# Patient Record
Sex: FEMALE | Race: WHITE | HISPANIC OR LATINO | Employment: FULL TIME | URBAN - METROPOLITAN AREA
[De-identification: names, ages, dates, MRNs, and addresses within clinical notes are randomized per-mention and may not be internally consistent; named-entity substitution may affect disease eponyms.]

---

## 2020-04-07 ENCOUNTER — HOSPITAL ENCOUNTER (OUTPATIENT)
Facility: MEDICAL CENTER | Age: 30
End: 2020-04-07
Attending: ADVANCED PRACTICE MIDWIFE
Payer: MEDICAID

## 2020-04-07 ENCOUNTER — INITIAL PRENATAL (OUTPATIENT)
Dept: OBGYN | Facility: CLINIC | Age: 30
End: 2020-04-07
Payer: MEDICAID

## 2020-04-07 VITALS
BODY MASS INDEX: 30.79 KG/M2 | WEIGHT: 191.6 LBS | HEIGHT: 66 IN | SYSTOLIC BLOOD PRESSURE: 108 MMHG | DIASTOLIC BLOOD PRESSURE: 64 MMHG

## 2020-04-07 DIAGNOSIS — Z87.59 HISTORY OF PREGNANCY INDUCED HYPERTENSION: ICD-10-CM

## 2020-04-07 DIAGNOSIS — Z34.82 ENCOUNTER FOR SUPERVISION OF OTHER NORMAL PREGNANCY IN SECOND TRIMESTER: ICD-10-CM

## 2020-04-07 PROCEDURE — 87491 CHLMYD TRACH DNA AMP PROBE: CPT

## 2020-04-07 PROCEDURE — 87591 N.GONORRHOEAE DNA AMP PROB: CPT

## 2020-04-07 PROCEDURE — 59402 PR NEW OB HIGH RISK: CPT | Performed by: ADVANCED PRACTICE MIDWIFE

## 2020-04-07 PROCEDURE — 88175 CYTOPATH C/V AUTO FLUID REDO: CPT

## 2020-04-07 SDOH — HEALTH STABILITY: MENTAL HEALTH: HOW OFTEN DO YOU HAVE A DRINK CONTAINING ALCOHOL?: NEVER

## 2020-04-07 NOTE — PROGRESS NOTES
NOB today   LMP: Approx: mid of November   Last pap: Pt unsure   Phone # [phone]  Pharmacy confirmed  C/o Pt feels some fetal movement. Headaches   AFP pended

## 2020-04-07 NOTE — LETTER
Estudios Para Detectar los Portadores de la Fibrosis Quística   (La Enfermedad Fibroquística del Páncreas)    Tiffanie Rush    Esta información esta relacionada con un examen de cora que puede determinar si usted o carrillo zak es portador del gen que causa la enfermedad hereditaria que se llama la fibrosis quística.     ¿QUE ES LA ENFERMEDAD FIBROSIS QUÍSTICA?  · La  fibrosis quística es devin enfermedad hereditaria que afecta a más de 25,000 niños y jóvenes adultos americanos.  · Los síntomas de la fibrosis quística varían de devin persona a otra.  Los síntomas más comunes son congestión pulmonar, diarrea y retraso en el crecimiento del austin.  La mayoría de las personas con la fibrosis quística padecen de problemas médicos muy severos, y algunas mueren jóvenes.  Otras tienen tan pocos síntomas que no se percatan de que tienen fibrosis quística.  · La fibrosis quística no afecta en absoluto la inteligencia de la persona.  · Aunque actualmente no hay devin tin para la fibrosis quística, los científicos están avanzando con respecto a nuevos tratamientos y en la búsqueda de la tin.  Anteriormente la mayoría de las personas que padecían de fibrosis quística morían muy jóvenes.  Hoy en día, muchas personas que padecen de la fibrosis quística sobreviven hasta los 20 o 30 anos de edad.    ¿EXISTE LA POSIBILIDAD DE QUE MI JORDANA PUEDA TENER FIBROSIS QUÍSTICA?  · Usted puede tener un hijo con la fibrosis quística aun cuando no hay nadie en carrillo louis que la padezca.  (Lea la grafica que sigue.)  · Existe devin prueba que puede ayudarle a determinar si rosi un gen para la fibrosis quística y si por eso corre un riesgo de tener un hijo con la enfermedad.  · Si ambos padres son portadores del gen para la fibrosis quística, hay devin (1) probabilidad entre 4 (25%) en cada embarazo, de que puedan tener un hijo afectada con fibrosis quística.  · Los portadores del gen para la fibrosis quística tienen devin copia del gen normal y  el otro gen alterado.  · Las personas con fibrosis quística tienen dos genes alterados para la fibrosis quística,  · Normalmente la mayoría de individuos tienen dos copias normales del gen para fibrosis quística.    Riesgo aproximado de que devin zak sin familiares con fibrosis quística pueda tener un hijo con fibrosis quística:  Origen / Riesgo  Devin zak Caucásica (de aj renato):  1 en 2,500  Devin zak :  1 en 8,000  Devin zak Afroamericana (de aj concha):  1 en 15,000  Devin zak Asiática:  1 en 32,000    ¿EXISTEN PRUEBAS PARA DETECTAR LOS PORTADORES DE LA FIBROSIS QUÍSTICA?  · Hay devin prueba de cora para determinar si usted o carrillo zak portan el gen para la fibrosis quística.  · Es importante entender que la prueba no detecta todos los portadores del gen para la fibrosis quística.  · Si la prueba determina que ambos padres con portadores, se puede realizar otras pruebas para determinar si carrillo futuro jay esta afectado.    ¿CUANTO HAZEL LA PRUEBA PARA DETERMINAR SI SOY PORTADOR(A) DEL GEN PARA LA FIBROSIS QUÍSTICA?  · El costo de la prueba varia según carrillo póliza de seguro medico y el laboratorio donde se efectúa el análisis.  · El costo promedio de la prueba es de $300.  · Carrillo consejera genética puede darle mas información acera de esta prueba para determinar si usted es portador de la fibrosis quística.    _____  Si, yo estoy interesada y me gustaria obtener mas información al respecto.  _____  No tengo interés ni en hacerme la prueba para determinar si soy portador(a) de gen para la fibrosis quística ni en recibir mas información al respecto.    Firma del paciente: _____________________________   4/7/2020

## 2020-04-07 NOTE — LETTER
April 7, 2020            Tiffanie Rush is currently being cared for at [facility].  This patient is pregnant and may continue to work.  She should not lift greater than 20 pounds and requires frequent rest periods (10 minutes every two hours).    She currently has a due date of 8/21/20        Thank you,          Adrianne Contreras C.N.M.

## 2020-04-07 NOTE — PROGRESS NOTES
Subjective:   Tiffanie Rush is a 29 y.o.  who presents for her new OB exam.  She is 20w4d with an MITCH of Estimated Date of Delivery: 20 by LMP. She is feeling well and has no concerns at this time. Denies VB, LOF, contractions or pain. She reports no ER visits or previous care in this pregnancy. Denies dysuria, vaginal DC, fever. Reports good fetal movement. Desires AFP.  Declines CF.      History reviewed. No pertinent past medical history.    Psych Hx: Patient denies any history of depression, anxiety, PTSD, bipolar or any other psychological issues.     History reviewed. No pertinent surgical history.     OB History    Para Term  AB Living   3 2 1     2   SAB TAB Ectopic Molar Multiple Live Births             2      # Outcome Date GA Lbr Onel/2nd Weight Sex Delivery Anes PTL Lv   3 Current            2 Term 14 40w0d  4 kg (8 lb 13.1 oz) M Vag-Spont  N RADHA   1 Para 10/25/10 40w0d  4.7 kg (10 lb 5.8 oz) M Vag-Spont  N RADHA        Gynecological Hx: Denies any hx of STIs, including HSV. Denies any vulvovaginal disorders and no hx of abnormal cervical cytology. Last pap unknown    Sexual Hx: One current male partner, who is  FOB     Family History   Problem Relation Age of Onset   • Diabetes Father      Denies any genetic disorders in family history.     Social History     Socioeconomic History   • Marital status:      Spouse name: Not on file   • Number of children: Not on file   • Years of education: Not on file   • Highest education level: Not on file   Occupational History   • Not on file   Social Needs   • Financial resource strain: Not on file   • Food insecurity     Worry: Not on file     Inability: Not on file   • Transportation needs     Medical: Not on file     Non-medical: Not on file   Tobacco Use   • Smoking status: Never Smoker   • Smokeless tobacco: Never Used   Substance and Sexual Activity   • Alcohol use: Not Currently     Frequency: Never   • Drug use: Never  "  • Sexual activity: Not Currently     Partners: Male   Lifestyle   • Physical activity     Days per week: Not on file     Minutes per session: Not on file   • Stress: Not on file   Relationships   • Social connections     Talks on phone: Not on file     Gets together: Not on file     Attends Religion service: Not on file     Active member of club or organization: Not on file     Attends meetings of clubs or organizations: Not on file     Relationship status: Not on file   • Intimate partner violence     Fear of current or ex partner: Not on file     Emotionally abused: Not on file     Physically abused: Not on file     Forced sexual activity: Not on file   Other Topics Concern   • Not on file   Social History Narrative   • Not on file       FOB is not and lives with Tiffanie Rush.  Pregnancy is un planned but desired.    She is currently working at [employer] and planning to work until end of pregnancy , denies any heavy lifting or exposure to potential teratogens like environmental or occupational toxins.   Denies alcohol use, drug use, or tobacco use in pregnancy.   Denies any current or hx of sexual, emotional or physical abuse or trauma.     Current Medications: PNV  Allergies: Denies allergies to medications, food, or environmental allergies    Objective:      Vitals:    04/07/20 0850   BP: 108/64   Weight: 86.9 kg (191 lb 9.6 oz)   Height: 1.676 m (5' 6\")        See Prenatal Physical and Prenatal Vitals  UA WNL today      Assessment:      1.  IUP @ 20w4d per LMP      2.  S=D      3.  See problem list as follows     There are no active problems to display for this patient.        Plan:   -  GC/CT & pap done today   - Prenatal labs ordered - lab slip provided. PIH labs added for patient related to history of hypertension. At this time, as history is unclear, will defer use of ASA therapy.   - Discussed PNV, nutrition, adequate water intake, and exercise/weight gain in pregnancy  - NOB informational packet " with anticipatory guidance given  - Reviewed Centering Pregnancy and patient is not candidate. Work schedule limits.   - S/sx of pregnancy warning signs and PTL precautions given  - Complete OB US in ASAP wks  - Return to clinic in 4 weeks.

## 2020-04-08 LAB
C TRACH DNA GENITAL QL NAA+PROBE: NEGATIVE
CYTOLOGY REG CYTOL: NORMAL
N GONORRHOEA DNA GENITAL QL NAA+PROBE: NEGATIVE
SPECIMEN SOURCE: NORMAL

## 2020-04-15 ENCOUNTER — TELEPHONE (OUTPATIENT)
Dept: OBGYN | Facility: CLINIC | Age: 30
End: 2020-04-15

## 2020-04-15 NOTE — TELEPHONE ENCOUNTER
----- Message from Adrianne Contreras C.N.M. sent at 4/9/2020 11:36 AM PDT -----  Noted; per guidelines recommend colpo now or 6 weeks postpartum. Will ask that patient treat yeast with OTC cream.  4/15/2020@8:48am. N/a, msg left for patient to call back.    Notes recorded by ALMA Galdamez on 4/20/2020 at 12:29 PM PDT  Pt to see Nikhil for another US to further assess heart.    4/21/20@1:54pm. N/a, msg left for patient to call back. Letter sent

## 2020-04-15 NOTE — LETTER
April 21, 2020          Dear Tiffanie Rush,      Please call us regarding your care.  One of the nurses is available to speak with you Monday through Friday, 8 a.m. to 5 p.m.    Please call us at [phone]; thank you for your prompt attention.        Sincerely,          Adrianne Contreras C.N.M.    Electronically Signed

## 2020-04-20 ENCOUNTER — HOSPITAL ENCOUNTER (OUTPATIENT)
Dept: LAB | Facility: MEDICAL CENTER | Age: 30
End: 2020-04-20
Attending: ADVANCED PRACTICE MIDWIFE
Payer: MEDICAID

## 2020-04-20 ENCOUNTER — APPOINTMENT (OUTPATIENT)
Dept: RADIOLOGY | Facility: IMAGING CENTER | Age: 30
End: 2020-04-20
Attending: ADVANCED PRACTICE MIDWIFE
Payer: MEDICAID

## 2020-04-20 DIAGNOSIS — Z87.59 HISTORY OF PREGNANCY INDUCED HYPERTENSION: ICD-10-CM

## 2020-04-20 DIAGNOSIS — O28.3 ABNORMAL PREGNANCY US: ICD-10-CM

## 2020-04-20 DIAGNOSIS — Z34.82 ENCOUNTER FOR SUPERVISION OF OTHER NORMAL PREGNANCY IN SECOND TRIMESTER: ICD-10-CM

## 2020-04-20 LAB
ABO GROUP BLD: NORMAL
ALBUMIN SERPL BCP-MCNC: 3.8 G/DL (ref 3.2–4.9)
ALBUMIN/GLOB SERPL: 1.2 G/DL
ALP SERPL-CCNC: 80 U/L (ref 30–99)
ALT SERPL-CCNC: 15 U/L (ref 2–50)
ANION GAP SERPL CALC-SCNC: 16 MMOL/L (ref 7–16)
AST SERPL-CCNC: 18 U/L (ref 12–45)
BASOPHILS # BLD AUTO: 0.2 % (ref 0–1.8)
BASOPHILS # BLD: 0.02 K/UL (ref 0–0.12)
BILIRUB SERPL-MCNC: 0.3 MG/DL (ref 0.1–1.5)
BLD GP AB SCN SERPL QL: NORMAL
BUN SERPL-MCNC: 4 MG/DL (ref 8–22)
CALCIUM SERPL-MCNC: 8.8 MG/DL (ref 8.5–10.5)
CHLORIDE SERPL-SCNC: 102 MMOL/L (ref 96–112)
CO2 SERPL-SCNC: 20 MMOL/L (ref 20–33)
CREAT SERPL-MCNC: 0.49 MG/DL (ref 0.5–1.4)
EOSINOPHIL # BLD AUTO: 0.07 K/UL (ref 0–0.51)
EOSINOPHIL NFR BLD: 0.8 % (ref 0–6.9)
ERYTHROCYTE [DISTWIDTH] IN BLOOD BY AUTOMATED COUNT: 45.1 FL (ref 35.9–50)
GLOBULIN SER CALC-MCNC: 3.1 G/DL (ref 1.9–3.5)
GLUCOSE SERPL-MCNC: 85 MG/DL (ref 65–99)
HBV SURFACE AG SER QL: ABNORMAL
HCT VFR BLD AUTO: 40.7 % (ref 37–47)
HCV AB SER QL: ABNORMAL
HGB BLD-MCNC: 13.6 G/DL (ref 12–16)
HIV 1+2 AB+HIV1 P24 AG SERPL QL IA: NORMAL
IMM GRANULOCYTES # BLD AUTO: 0.02 K/UL (ref 0–0.11)
IMM GRANULOCYTES NFR BLD AUTO: 0.2 % (ref 0–0.9)
LDH SERPL L TO P-CCNC: 199 U/L (ref 107–266)
LYMPHOCYTES # BLD AUTO: 1.52 K/UL (ref 1–4.8)
LYMPHOCYTES NFR BLD: 16.3 % (ref 22–41)
MCH RBC QN AUTO: 30.1 PG (ref 27–33)
MCHC RBC AUTO-ENTMCNC: 33.4 G/DL (ref 33.6–35)
MCV RBC AUTO: 90 FL (ref 81.4–97.8)
MONOCYTES # BLD AUTO: 0.48 K/UL (ref 0–0.85)
MONOCYTES NFR BLD AUTO: 5.2 % (ref 0–13.4)
MORPHOLOGY BLD-IMP: NORMAL
NEUTROPHILS # BLD AUTO: 7.2 K/UL (ref 2–7.15)
NEUTROPHILS NFR BLD: 77.3 % (ref 44–72)
NRBC # BLD AUTO: 0 K/UL
NRBC BLD-RTO: 0 /100 WBC
PLATELET # BLD AUTO: 227 K/UL (ref 164–446)
PMV BLD AUTO: 10.8 FL (ref 9–12.9)
POTASSIUM SERPL-SCNC: 3.7 MMOL/L (ref 3.6–5.5)
PROT SERPL-MCNC: 6.9 G/DL (ref 6–8.2)
RBC # BLD AUTO: 4.52 M/UL (ref 4.2–5.4)
RH BLD: NORMAL
RUBV AB SER QL: 90.4 IU/ML
SODIUM SERPL-SCNC: 138 MMOL/L (ref 135–145)
TREPONEMA PALLIDUM IGG+IGM AB [PRESENCE] IN SERUM OR PLASMA BY IMMUNOASSAY: ABNORMAL
URATE SERPL-MCNC: 3.1 MG/DL (ref 1.9–8.2)
WBC # BLD AUTO: 9.3 K/UL (ref 4.8–10.8)

## 2020-04-20 PROCEDURE — 80053 COMPREHEN METABOLIC PANEL: CPT

## 2020-04-20 PROCEDURE — 84550 ASSAY OF BLOOD/URIC ACID: CPT

## 2020-04-20 PROCEDURE — 86780 TREPONEMA PALLIDUM: CPT

## 2020-04-20 PROCEDURE — 76805 OB US >/= 14 WKS SNGL FETUS: CPT | Mod: TC | Performed by: OBSTETRICS & GYNECOLOGY

## 2020-04-20 PROCEDURE — 85025 COMPLETE CBC W/AUTO DIFF WBC: CPT

## 2020-04-20 PROCEDURE — 80307 DRUG TEST PRSMV CHEM ANLYZR: CPT

## 2020-04-20 PROCEDURE — 81511 FTL CGEN ABNOR FOUR ANAL: CPT

## 2020-04-20 PROCEDURE — 83615 LACTATE (LD) (LDH) ENZYME: CPT

## 2020-04-20 PROCEDURE — 86900 BLOOD TYPING SEROLOGIC ABO: CPT

## 2020-04-20 PROCEDURE — 87389 HIV-1 AG W/HIV-1&-2 AB AG IA: CPT

## 2020-04-20 PROCEDURE — 86901 BLOOD TYPING SEROLOGIC RH(D): CPT

## 2020-04-20 PROCEDURE — 86803 HEPATITIS C AB TEST: CPT

## 2020-04-20 PROCEDURE — 86850 RBC ANTIBODY SCREEN: CPT

## 2020-04-20 PROCEDURE — 36415 COLL VENOUS BLD VENIPUNCTURE: CPT

## 2020-04-20 PROCEDURE — 86762 RUBELLA ANTIBODY: CPT

## 2020-04-20 PROCEDURE — 87340 HEPATITIS B SURFACE AG IA: CPT

## 2020-04-22 LAB
AMPHET CTO UR CFM-MCNC: NEGATIVE NG/ML
BARBITURATES CTO UR CFM-MCNC: NEGATIVE NG/ML
BENZODIAZ CTO UR CFM-MCNC: NEGATIVE NG/ML
CANNABINOIDS CTO UR CFM-MCNC: NEGATIVE NG/ML
COCAINE CTO UR CFM-MCNC: NEGATIVE NG/ML
DRUG COMMENT 753798: NORMAL
METHADONE CTO UR CFM-MCNC: NEGATIVE NG/ML
OPIATES CTO UR CFM-MCNC: NEGATIVE NG/ML
PCP CTO UR CFM-MCNC: NEGATIVE NG/ML
PROPOXYPH CTO UR CFM-MCNC: NEGATIVE NG/ML

## 2020-04-23 LAB
# FETUSES US: NORMAL
AFP MOM SERPL: 1.63
AFP SERPL-MCNC: 115 NG/ML
AGE - REPORTED: 28.2 YR
CURRENT SMOKER: NO
FAMILY MEMBER DISEASES HX: NO
GA METHOD: NORMAL
GA: NORMAL WK
HCG MOM SERPL: 0.33
HCG SERPL-ACNC: 5547 IU/L
HX OF HEREDITARY DISORDERS: NO
IDDM PATIENT QL: NO
INHIBIN A MOM SERPL: 0.9
INHIBIN A SERPL-MCNC: 170 PG/ML
INTEGRATED SCN PATIENT-IMP: NORMAL
PATHOLOGY STUDY: NORMAL
SPECIMEN DRAWN SERPL: NORMAL
U ESTRIOL MOM SERPL: 1.14
U ESTRIOL SERPL-MCNC: 3.5 NG/ML

## 2020-04-28 DIAGNOSIS — R87.612 LGSIL ON PAP SMEAR OF CERVIX: ICD-10-CM

## 2020-04-28 NOTE — TELEPHONE ENCOUNTER
Pt called back, notified of abnormal pap and need colposcopy. Procedure explained to pt and informed pt she will receive a call from our office once procedure is authorized by insurance. All questions answered. Pt agreed and verbalized understanding.  Pt laos notified of + yeast and instructions given on medication and use. Pt understood   Pt also notified of US results and will call Dr. Tejeda's office to schedule appt

## 2020-04-29 ENCOUNTER — TELEPHONE (OUTPATIENT)
Dept: OBGYN | Facility: CLINIC | Age: 30
End: 2020-04-29

## 2020-04-29 NOTE — TELEPHONE ENCOUNTER
Pt called triage line stating she was referred to a Dr. Tejeda's office but when she called it stated office was closed. Informed pt that I had just called and Shante  answered the phone and they are open, instructed pt to call again now. Pt also states was supposed to make an appt for Colpo but was told needed to see if insurance company need prior auth.  Informed pt that I do not have a provided to consult with right now to see if she needs to schedule appt now or post partum, due to them being at lunch will call her back after lunch. Pt verbalized understanding.     Per consult with Dr. Stephanie Reed pt does need to schedule appt for Colpo, PAR Henry to call pt to schedule ASAP.

## 2020-05-07 ENCOUNTER — GYNECOLOGY VISIT (OUTPATIENT)
Dept: OBGYN | Facility: CLINIC | Age: 30
End: 2020-05-07
Payer: MEDICAID

## 2020-05-07 VITALS — WEIGHT: 190 LBS | DIASTOLIC BLOOD PRESSURE: 66 MMHG | BODY MASS INDEX: 30.67 KG/M2 | SYSTOLIC BLOOD PRESSURE: 112 MMHG

## 2020-05-07 DIAGNOSIS — R87.612 LGSIL ON PAP SMEAR OF CERVIX: ICD-10-CM

## 2020-05-07 DIAGNOSIS — Z34.82 ENCOUNTER FOR SUPERVISION OF OTHER NORMAL PREGNANCY IN SECOND TRIMESTER: ICD-10-CM

## 2020-05-07 DIAGNOSIS — O28.3 ABNORMAL PREGNANCY US: ICD-10-CM

## 2020-05-07 PROBLEM — Z34.90 SUPERVISION OF NORMAL PREGNANCY: Status: ACTIVE | Noted: 2020-05-07

## 2020-05-07 PROCEDURE — 90040 PR PRENATAL FOLLOW UP: CPT | Mod: 24 | Performed by: OBSTETRICS & GYNECOLOGY

## 2020-05-07 ASSESSMENT — FIBROSIS 4 INDEX: FIB4 SCORE: 0.55

## 2020-05-07 NOTE — NON-PROVIDER
Patient here today for a Colposcopy.  Phone # [phone]  Pharmacy verified.  Interpretor # [id number]

## 2020-05-07 NOTE — PROGRESS NOTES
S: Pt presents for colposcopy and  routine OB follow up.  No contractions, vaginal bleeding, or leaking fluids. Good fetal movement.     Denies h/a vision changes sob RUQ pain or swelling.  Initial PNV notes history of PIH.  Clarification with  today states she had severe headache in early of THIS pregnancy but she never had elevated pressures, h/a, vision changes or swelling in hands and face in any of her previous pregnancies.     (Pt was originally here for colposcopy, please see note below.  It was turned into a prenatal visit)    Questions answered.    O: /66 (BP Location: Right arm, Patient Position: Sitting)   Wt 86.2 kg (190 lb)   LMP 11/15/2019 (Approximate)   BMI 30.67 kg/m²   Patients' weight gain, fluid intake and exercise level discussed.  Vitals, fundal height , fetal position, and FHR reviewed on flowsheet    Lab:No results found for this or any previous visit (from the past 336 hour(s)).    A/P:  29 y.o.  at 24w6d presents for routine obstetric follow-up and colposcopy consultation.  Size equals dates and/or scan  Pt does not have hx of PIH, is of normal risk   Genetic screen negative  Pending consult with Dr. Tejeda for aneurysmal foramen ovale  3rd tri labs given to be performed in 3-4 weeks before next visit   1.  Continue prenatal vitamins.  2.  Begin kick counts at 28 weeks.  3.  Exercise at least 30 minutes daily.  4.  Drink at least 2 Liters of water daily  5.  Follow-up in 4 weeks.    Regarding her colposcopy:   Extensive discussion regarding normal cellular maturation of cervix, meaning of pap smear results and its place as a screening tool only.   The significance of HPV (Human Papilloma Virus) and its role in cervical pathology discussed and that her status is unknown.    Disease states discussed - cervical atypia, ASCUS, AGCUS and DIANE discussed with emphasis on not only progression of the disease, but the high percentage of spontaneous regression of disease to  normal (~ 85% for DIANE l).  Colposcopy and possible biopsy both explained as necessary tools to diagnose and map out extent of disease.   Larger Cervical biopsy (LEEP excision and Conization) discussed as possible recommended treatment courses, if any disease found, as well as possible observation only .   Discussed recommendations of ACOG as well as ASCCP recommendations regarding colposcopy and follow up.  She has LSIL with an unknown HPV.  I discussed with the patient that it is preferred to have a colposcopy in her age group with this diagnosis, but given her more advanced gestational age we can defer to postpartum for evaluation.  This is also an acceptable recommendation in the pregnant population.        Spent 45 minutes minutes with the patient face to face, with 30 mins of this time spent in counseling and coordination of care, surrounding the abnormal pap smear and answering questions.

## 2020-05-15 ENCOUNTER — DATING (OUTPATIENT)
Dept: OBGYN | Facility: CLINIC | Age: 30
End: 2020-05-15

## 2020-05-26 ENCOUNTER — HOSPITAL ENCOUNTER (OUTPATIENT)
Dept: LAB | Facility: MEDICAL CENTER | Age: 30
End: 2020-05-26
Attending: OBSTETRICS & GYNECOLOGY
Payer: MEDICAID

## 2020-05-26 DIAGNOSIS — Z34.82 ENCOUNTER FOR SUPERVISION OF OTHER NORMAL PREGNANCY IN SECOND TRIMESTER: ICD-10-CM

## 2020-05-26 LAB
ERYTHROCYTE [DISTWIDTH] IN BLOOD BY AUTOMATED COUNT: 44.5 FL (ref 35.9–50)
GLUCOSE 1H P 50 G GLC PO SERPL-MCNC: 137 MG/DL (ref 70–139)
HCT VFR BLD AUTO: 40.5 % (ref 37–47)
HGB BLD-MCNC: 13.1 G/DL (ref 12–16)
MCH RBC QN AUTO: 30 PG (ref 27–33)
MCHC RBC AUTO-ENTMCNC: 32.3 G/DL (ref 33.6–35)
MCV RBC AUTO: 92.7 FL (ref 81.4–97.8)
PLATELET # BLD AUTO: 241 K/UL (ref 164–446)
PMV BLD AUTO: 10.7 FL (ref 9–12.9)
RBC # BLD AUTO: 4.37 M/UL (ref 4.2–5.4)
TREPONEMA PALLIDUM IGG+IGM AB [PRESENCE] IN SERUM OR PLASMA BY IMMUNOASSAY: NORMAL
WBC # BLD AUTO: 10.1 K/UL (ref 4.8–10.8)

## 2020-05-26 PROCEDURE — 86780 TREPONEMA PALLIDUM: CPT

## 2020-05-26 PROCEDURE — 82950 GLUCOSE TEST: CPT

## 2020-05-26 PROCEDURE — 85027 COMPLETE CBC AUTOMATED: CPT

## 2020-05-26 PROCEDURE — 36415 COLL VENOUS BLD VENIPUNCTURE: CPT

## 2020-06-04 ENCOUNTER — ROUTINE PRENATAL (OUTPATIENT)
Dept: OBGYN | Facility: CLINIC | Age: 30
End: 2020-06-04
Payer: MEDICAID

## 2020-06-04 VITALS — BODY MASS INDEX: 30.76 KG/M2 | DIASTOLIC BLOOD PRESSURE: 60 MMHG | WEIGHT: 190.6 LBS | SYSTOLIC BLOOD PRESSURE: 108 MMHG

## 2020-06-04 DIAGNOSIS — Z34.83 ENCOUNTER FOR SUPERVISION OF OTHER NORMAL PREGNANCY, THIRD TRIMESTER: Primary | ICD-10-CM

## 2020-06-04 PROCEDURE — 90715 TDAP VACCINE 7 YRS/> IM: CPT | Performed by: NURSE PRACTITIONER

## 2020-06-04 PROCEDURE — 90471 IMMUNIZATION ADMIN: CPT | Performed by: NURSE PRACTITIONER

## 2020-06-04 PROCEDURE — 90040 PR PRENATAL FOLLOW UP: CPT | Performed by: NURSE PRACTITIONER

## 2020-06-04 ASSESSMENT — FIBROSIS 4 INDEX: FIB4 SCORE: 0.54

## 2020-06-04 NOTE — PROGRESS NOTES
S) Pt is a 30 y.o.   at 28w6d  gestation. Routine prenatal care today. 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  She is wearing a pregnancy belt.    Fetal movement Normal  Cramping no  VB no  LOF no   Denies dysuria. Generally feels well today. Good self-care activities identified. Denies headaches, swelling, visual changes, or epigastric pain .     O) Wt 86.5 kg (190 lb 9.6 oz)         Labs: WNL       PNL: WNL       GCT: 137       AFP: normal       GBS: N/A       Pertinent ultrasound - 20 at 22w1d. EFW 52%tile, right lateral placenta without previa, 3VC, normal insertion. LILY normal, AFO --> Needs  echo: 20 Dr Tejeda, AFP, rec  echo             A) IUP at 28w6d       S=D         Patient Active Problem List    Diagnosis Date Noted   • LGSIL on Pap smear of cervix 2020   • Supervision of normal pregnancy 2020   • Abnormal pregnancy US 2020          SVE: deferred         TDAP: yes       FLU: no        BTL: yes       : no       C/S Consent: no       IOL or C/S scheduled: no       LAST PAP: 20 LGSIL, needs pp pap         P) s/s ptl vs general discomforts. Fetal movements reviewed. General ed and anticipatory guidance. Nutrition/exercise/vitamin. Plans breast Plans pp contraception- unsure-BTL if c/s, if not possibly IUD, no nexplanon, no estrogen (migraines)  Continue PNV.   Letter for work for lifting, water/urine breaks.  Advised tylenol, heat and ice ok.  TDAP today  Discussed  echo rec from Dr Tejeda, no further f/u at this time  RTC 2 weeks or PRN.

## 2020-06-04 NOTE — LETTER
"Count Your Baby's Movements  Another step to a healthy delivery    Tiffanie Rush            Dept: [phone]    How Many Weeks Pregnant? 28w6d    Date to Begin Countin2020              How to use this chart    One way for your physician to keep track of your baby's health is by knowing how often the baby moves (or \"kicks\") in your womb.  You can help your physician to do this by using this chart every day.    Every day, you should see how many hours it takes for your baby to move 10 times.  Start in the morning, as soon as you get up.    · First, write down the time your baby moves until you get to 10.  · Check off one box every time your baby moves until you get to 10.  · Write down the time you finished counting in the last column.  · Total how long it took to count up all 10 movements.  · Finally, fill in the box that shows how long this took.  After counting 10 movements, you no longer have to count any more that day.  The next morning, just start counting again as soon as you get up.    What should you call a \"movement\"?  It is hard to say, because it will feel different from one mother to another and from one pregnancy to the next.  The important thing is that you count the movements the same way throughout your pregnancy.  If you have more questions, you should ask your physician.    Count carefully every day!  SAMPLE:  Week 28    How many hours did it take to feel 10 movements?       Start  Time     1     2     3     4     5     6     7     8     9     10   Finish Time   Mon 8:20 ·  ·  ·  ·  ·  ·  ·  ·  ·  ·  11:40                  Sat               Sun                 IMPORTANT: You should contact your physician if it takes more than two hours for you to feel 10 movements.  Each morning, write down the time and start to count the movements of your baby.  Keep track by checking off one box every time you feel one movement.  When you have " "felt 10 \"kicks\", write down the time you finished counting in the last column.  Then fill in the   box (over the check maggie) for the number of hours it took.  Be sure to read the complete instructions on the previous page.            "

## 2020-06-04 NOTE — LETTER
June 4, 2020              Tiffanie Rush is currently being cared for at [facility].  Her hours/activities need to be modified.  She should not lift over 20 pounds repetitively. She should also be allowed proper breaks in order to consume water and  to urinate.  A chair or stool to use would be an appreciated accomodation, although not required.  Please do not hesitate to contact me with any questions.        Thank you,          DORA He.    Electronically Signed

## 2020-06-04 NOTE — PROGRESS NOTES
Pt. Here for OB/FU. Reports Good FM.   Good # [phone]  Pt. Denies VB, LOF, or UC's.   Pharmacy verified.   Chaperone offered and not indicated  Pt states having some lower back pain and pelvic pain  Kick count sheet given today along with instructions  BTL desired  Tdap today  Tdap vaccine given. Right Deltoid. Screening checklist reviewed with patient. VIS given. Pt Tolerated? Yes   Verified by Jessica LOPEZ

## 2020-06-18 ENCOUNTER — ROUTINE PRENATAL (OUTPATIENT)
Dept: OBGYN | Facility: CLINIC | Age: 30
End: 2020-06-18
Payer: MEDICAID

## 2020-06-18 VITALS — SYSTOLIC BLOOD PRESSURE: 120 MMHG | DIASTOLIC BLOOD PRESSURE: 60 MMHG | WEIGHT: 191 LBS | BODY MASS INDEX: 30.83 KG/M2

## 2020-06-18 DIAGNOSIS — Z34.83 ENCOUNTER FOR SUPERVISION OF OTHER NORMAL PREGNANCY, THIRD TRIMESTER: Primary | ICD-10-CM

## 2020-06-18 PROCEDURE — 90040 PR PRENATAL FOLLOW UP: CPT | Performed by: NURSE PRACTITIONER

## 2020-06-18 ASSESSMENT — FIBROSIS 4 INDEX: FIB4 SCORE: 0.54

## 2020-06-18 NOTE — PROGRESS NOTES
Pt here today for OB follow up  Reports +FM  WT: 191 lb  BP: 120/60  Preferred pharmacy verified with pt.  Pt states no complaints or concerns today   Good # [phone]

## 2020-06-18 NOTE — PROGRESS NOTES
S) Pt is a 30 y.o.   at 30w6d  gestation. Routine prenatal care today. Still having some lower back pain while at work and feels like everything in front is stretching and pulling.    Fetal movement Normal  Cramping no  VB no  LOF no   Denies dysuria. Generally feels well today. Good self-care activities identified. Denies headaches, swelling, visual changes, or epigastric pain .     O) See flow sheet for vital signs and fetal measurements.          Labs: WNL       PNL: WNL       GCT: 137       AFP: normal       GBS: N/A       Pertinent ultrasound - 20 at 22w1d. EFW 52%tile, right lateral placenta without previa, 3VC, normal insertion. LILY normal, AFO --> Needs  echo: 20 Dr Tejeda, AFP, rec  echo           A) IUP at 30w6d       S=D         Patient Active Problem List    Diagnosis Date Noted   • LGSIL on Pap smear of cervix 2020   • Supervision of normal pregnancy 2020   • Abnormal pregnancy US 2020          SVE: deferred         TDAP: yes       FLU: no        BTL: yes       : no       C/S Consent: no       IOL or C/S scheduled: no       LAST PAP: 20 LGSIL, needs pap PP         P) s/s ptl vs general discomforts. Fetal movements reviewed. General ed and anticipatory guidance. Nutrition/exercise/vitamin. Plans breast Plans pp contraception- Paragard  Continue PNV.   Encouraged to continue to use pregnancy belt while at work  RTC 2 weeks or PRN.

## 2020-07-02 ENCOUNTER — ROUTINE PRENATAL (OUTPATIENT)
Dept: OBGYN | Facility: CLINIC | Age: 30
End: 2020-07-02
Payer: MEDICAID

## 2020-07-02 VITALS — WEIGHT: 192 LBS | DIASTOLIC BLOOD PRESSURE: 60 MMHG | BODY MASS INDEX: 30.99 KG/M2 | SYSTOLIC BLOOD PRESSURE: 116 MMHG

## 2020-07-02 DIAGNOSIS — Z34.83 ENCOUNTER FOR SUPERVISION OF OTHER NORMAL PREGNANCY, THIRD TRIMESTER: Primary | ICD-10-CM

## 2020-07-02 PROCEDURE — 90040 PR PRENATAL FOLLOW UP: CPT | Performed by: NURSE PRACTITIONER

## 2020-07-02 ASSESSMENT — FIBROSIS 4 INDEX: FIB4 SCORE: 0.54

## 2020-07-02 NOTE — PROGRESS NOTES
S) Pt is a 30 y.o.   at 32w6d  gestation. Routine prenatal care today. Still having some lower back discomfort and some pelvic pressure.    Fetal movement Normal  Cramping no  VB no  LOF no   Denies dysuria. Generally feels well today. Good self-care activities identified. Denies headaches, swelling, visual changes, or epigastric pain .   Questions about where to go if I'm in labor before my due date    O) See flow sheet for vital signs and fetal measurements.          Labs: WNL       PNL: WNL       GCT: 137       AFP: normal       GBS: N/A       Pertinent ultrasound -   20 at 22w1d. EFW 52%tile, right lateral placenta without previa, 3VC, normal insertion. LILY normal, AFO --> Needs  echo: 20 Dr Tejeda, AFP, rec  echo       A) IUP at 32w6d       S=D         Patient Active Problem List    Diagnosis Date Noted   • LGSIL on Pap smear of cervix 2020   • Supervision of normal pregnancy 2020   • Abnormal pregnancy US 2020          SVE: deferred         TDAP: yes       FLU: no        BTL: yes       : no       C/S Consent: no       IOL or C/S scheduled: no       LAST PAP:  20 LGSIL, needs pap PP         P) s/s ptl vs general discomforts.   Fetal movements reviewed.  Reviewed where to go when labor presents and no need to schedule appointment    General ed and anticipatory guidance.   Nutrition/exercise/vitamin.   Plans breast   Plans pp contraception- BTL   Continue PNV.   RTO 2 weeks   Interpretor [id number]

## 2020-07-02 NOTE — PROGRESS NOTES
Pt. Here for OB/FU. Reports Good FM.   Good # [phone]  Pt states no complaints.   Pharmacy verified.   BTL signed

## 2020-07-16 ENCOUNTER — ROUTINE PRENATAL (OUTPATIENT)
Dept: OBGYN | Facility: CLINIC | Age: 30
End: 2020-07-16
Payer: MEDICAID

## 2020-07-16 VITALS — DIASTOLIC BLOOD PRESSURE: 70 MMHG | BODY MASS INDEX: 31.04 KG/M2 | WEIGHT: 192.3 LBS | SYSTOLIC BLOOD PRESSURE: 116 MMHG

## 2020-07-16 DIAGNOSIS — Z34.83 ENCOUNTER FOR SUPERVISION OF OTHER NORMAL PREGNANCY IN THIRD TRIMESTER: ICD-10-CM

## 2020-07-16 PROCEDURE — 90040 PR PRENATAL FOLLOW UP: CPT | Performed by: NURSE PRACTITIONER

## 2020-07-16 ASSESSMENT — FIBROSIS 4 INDEX: FIB4 SCORE: 0.54

## 2020-07-16 NOTE — PROGRESS NOTES
SUBJECTIVE:  Pt is a 30 y.o.   at 34w6d  gestation. Presents today for follow-up prenatal care. Reports no issues at this time.  Reports good fetal movement. Denies regular cramping/contractions, bleeding or leaking of fluid. Denies dysuria, headaches, N/V. Generally feels well today.     OBJECTIVE:  - See prenatal vitals flow  Vitals:    20 0805   BP: 116/70   Weight: 87.2 kg (192 lb 4.8 oz)                 ASSESSMENT:   - IUP at 34w6d    - S=D  Patient Active Problem List    Diagnosis Date Noted   • LGSIL on Pap smear of cervix 2020   • Supervision of normal pregnancy 2020   • Abnormal pregnancy  2020         PLAN:  - S/sx pregnancy and labor warning signs vs general discomforts discussed  - Fetal movements and/or kick counts reviewed   - Adequate hydration reinforced  - Nutrition/exercise/vitamin education; continue PNV  - S/p Tdap vacc  - Anticipatory guidance given  - RTC in 2 weeks for follow-up prenatal care

## 2020-07-16 NOTE — PROGRESS NOTES
OB follow up   + fetal movement.  No VB, LOF or UC's.  Wt: 192.3      BP: 116/70  Phone # [phone]  Preferred pharmacy confirmed yes

## 2020-07-30 ENCOUNTER — HOSPITAL ENCOUNTER (OUTPATIENT)
Facility: MEDICAL CENTER | Age: 30
End: 2020-07-30
Attending: NURSE PRACTITIONER
Payer: MEDICAID

## 2020-07-30 ENCOUNTER — ROUTINE PRENATAL (OUTPATIENT)
Dept: OBGYN | Facility: CLINIC | Age: 30
End: 2020-07-30
Payer: MEDICAID

## 2020-07-30 VITALS — DIASTOLIC BLOOD PRESSURE: 64 MMHG | WEIGHT: 191 LBS | SYSTOLIC BLOOD PRESSURE: 110 MMHG | BODY MASS INDEX: 30.83 KG/M2

## 2020-07-30 DIAGNOSIS — Z3A.36 36 WEEKS GESTATION OF PREGNANCY: ICD-10-CM

## 2020-07-30 PROCEDURE — 87081 CULTURE SCREEN ONLY: CPT

## 2020-07-30 PROCEDURE — 90040 PR PRENATAL FOLLOW UP: CPT | Performed by: NURSE PRACTITIONER

## 2020-07-30 PROCEDURE — 87150 DNA/RNA AMPLIFIED PROBE: CPT

## 2020-07-30 ASSESSMENT — FIBROSIS 4 INDEX: FIB4 SCORE: 0.54

## 2020-07-30 NOTE — PROGRESS NOTES
SUBJECTIVE:  Pt is a 30 y.o.   at 36w6d  gestation. Presents today for follow-up prenatal care. Reports no issues at this time.  Reports good fetal movement. Denies regular cramping/contractions, bleeding or leaking of fluid. Denies dysuria, headaches, N/V. Generally feels well today.      OBJECTIVE:  - See prenatal vitals flow  -   Vitals:    20 0802   BP: 110/64   Weight: 86.6 kg (191 lb)                 ASSESSMENT:   - IUP at 36w6d    - S=D   -   Patient Active Problem List    Diagnosis Date Noted   • LGSIL on Pap smear of cervix 2020   • Supervision of normal pregnancy 2020   • Abnormal pregnancy US 2020         PLAN:  - S/sx pregnancy and labor warning signs vs general discomforts discussed  - Fetal movements and/or kick counts reviewed   - Adequate hydration reinforced  - Nutrition/exercise/vitamin education; continue PNV  - Plans for breast and formulafeeding:handout given and reviewed   - Plans paragard IUD for contraception Pp  - Pt knows she will follow up with us for her pap and also at hospital for echocardiogram   - S/p Tdap vacc   - GBS collected   - Anticipatory guidance given  - RTC in 1 weeks for follow-up prenatal care

## 2020-07-30 NOTE — PROGRESS NOTES
OB follow up   + fetal movement.  No VB, LOF or UC's.  GBS today  No more follow ups with Dr. Tejeda.  Phone # [phone]  Preferred pharmacy confirmed.

## 2020-08-01 LAB — GP B STREP DNA SPEC QL NAA+PROBE: NEGATIVE

## 2020-08-06 ENCOUNTER — ROUTINE PRENATAL (OUTPATIENT)
Dept: OBGYN | Facility: CLINIC | Age: 30
End: 2020-08-06
Payer: MEDICAID

## 2020-08-06 VITALS — SYSTOLIC BLOOD PRESSURE: 118 MMHG | WEIGHT: 190.3 LBS | DIASTOLIC BLOOD PRESSURE: 70 MMHG | BODY MASS INDEX: 30.72 KG/M2

## 2020-08-06 DIAGNOSIS — Z34.83 ENCOUNTER FOR SUPERVISION OF OTHER NORMAL PREGNANCY IN THIRD TRIMESTER: Primary | ICD-10-CM

## 2020-08-06 DIAGNOSIS — O09.299 HISTORY OF MACROSOMIA IN INFANT IN PRIOR PREGNANCY, CURRENTLY PREGNANT: ICD-10-CM

## 2020-08-06 PROCEDURE — 90040 PR PRENATAL FOLLOW UP: CPT | Performed by: NURSE PRACTITIONER

## 2020-08-06 ASSESSMENT — FIBROSIS 4 INDEX: FIB4 SCORE: 0.54

## 2020-08-06 NOTE — PROGRESS NOTES
OB follow up   + fetal movement.  No VB, LOF   Light contractions  Wt:  190.3 lbs     BP: 118/70  Phone # [phone]  Preferred pharmacy confirmed. yes

## 2020-08-06 NOTE — PROGRESS NOTES
S) Pt is a 30 y.o.   at 37w6d  gestation. Routine prenatal care today. No complaints today, feeling tired and ready for baby. Is feeling some light contractions, but nothing really regular or painful. Discussed previous deliveries as her first was >10 lbs and 2nd was almost 9 lbs. Denies any problems including GDM, polyhydramnios, shoulder dystocia, or hemorrhage with deliveries. She thinks this baby is smaller, but would like IOL at 39-40 weeks for comfort. Referral placed today. GBS negative. Labor precautions reviewed, all questions answered.    Fetal movement Normal  Cramping no  VB no  LOF no   Denies dysuria. Generally feels well today. Good self-care activities identified. Denies headaches, swelling, visual changes, or epigastric pain .     O) /70   Wt 86.3 kg (190 lb 4.8 oz)         Labs:       PNL: WNL       GCT: 137        AFP: normal       GBS: negative       Pertinent ultrasound -        20- Survey WNL, LILY 17.92cm, c/w prev dating.     A) IUP at 37w6d       S=D         Patient Active Problem List    Diagnosis Date Noted   • History of macrosomia in infant in prior pregnancy, currently pregnant 2020   • LGSIL on Pap smear of cervix 2020   • Supervision of normal pregnancy 2020   • Abnormal pregnancy US 2020          SVE: deferred       Chaperone offered: n/a         TDAP: yes       FLU: no        BTL: yes       : n/a       C/S Consent: n/a       IOL or C/S scheduled: no- referral today       LAST PAP: 20- LGSIL- needs repeat in 1 year         P) s/s ptl vs general discomforts. Fetal movements reviewed. General ed and anticipatory guidance. Nutrition/exercise/vitamin. Plans breast Plans pp contraception- BTL.  Continue PNV.

## 2020-08-13 ENCOUNTER — ROUTINE PRENATAL (OUTPATIENT)
Dept: OBGYN | Facility: CLINIC | Age: 30
End: 2020-08-13
Payer: MEDICAID

## 2020-08-13 VITALS — DIASTOLIC BLOOD PRESSURE: 72 MMHG | BODY MASS INDEX: 30.73 KG/M2 | WEIGHT: 190.4 LBS | SYSTOLIC BLOOD PRESSURE: 120 MMHG

## 2020-08-13 DIAGNOSIS — Z34.83 ENCOUNTER FOR SUPERVISION OF OTHER NORMAL PREGNANCY IN THIRD TRIMESTER: Primary | ICD-10-CM

## 2020-08-13 PROCEDURE — 90040 PR PRENATAL FOLLOW UP: CPT | Performed by: NURSE PRACTITIONER

## 2020-08-13 ASSESSMENT — FIBROSIS 4 INDEX: FIB4 SCORE: 0.54

## 2020-08-13 NOTE — PROGRESS NOTES
S:  Pt is  at 38w6d here for routine OB follow up.  Reports questionable LOF last night, nothing today.  No ED or hospital visits since last seen. Reports good FM.  Denies VB, LOF, RUCs, or vaginal DC.     O:  Please see above vitals.        FHTs: 120        Fundal ht: 35 cm        Fetal position: cephalic confirmed by BSUS        SVE: /posterior        GBS negative  -- reviewed w pt.         nitrazine neg, no LOF noted        nitrazine     A:  IUP at 38w6d  Patient Active Problem List    Diagnosis Date Noted   • History of macrosomia in infant in prior pregnancy, currently pregnant 2020   • LGSIL on Pap smear of cervix 2020   • Supervision of normal pregnancy 2020   • Abnormal pregnancy US 2020       P:  1.  Anticipatory guidance given         2.  Labor precautions given.  Instructions given on where to go.  Pt receptive to education.         3.  D/w pt IOL policy.  IOL scheduled.        4.  Questions answered.         5.  Encouraged adequate water intake, walking 30-60 min daily, pelvic rocking, birthing ball       6.  F/u on 20 for IOL, paperwork provided. Pt to expect call from hospital in next few days for COVID testing.

## 2020-08-13 NOTE — NON-PROVIDER
S:  Pt is  at 38w6d here for routine OB follow up.  Patient has no concerns at this time.  No ED or hospital visits since last seen. Reports good FM.  Denies VB, RUCs, or vaginal DC. Patient states that she had a small amount of clear-yellow fluid leaking last night at 1700 but has not had any fluid since then.     O:  Please see above vitals.        FHTs: 120        Fundal ht: 35 cm        Fetal position: vertex        SVE: 1-2/thick/high        GBS negative      A:  IUP at 38w6d  Patient Active Problem List    Diagnosis Date Noted   • History of macrosomia in infant in prior pregnancy, currently pregnant 2020   • LGSIL on Pap smear of cervix 2020   • Supervision of normal pregnancy 2020   • Abnormal pregnancy US 2020       P:  1. Anticipatory guidance given        2. Induction 20 @ 10 am - patient aware       3.  Labor precautions given.  Instructions given on where to go.  Pt receptive to              education.         4.  Questions answered.         5.  Encouraged adequate water intake, walking 30-60 min daily       6. BS US - to confirm cephalic presentation.        7. Nitrazine test to rule out ROM - negative

## 2020-08-13 NOTE — PROGRESS NOTES
OB follow up   + fetal movement.  No VB, LOF   Pt states small contractions   Wt:  190.4     BP:  120/72  Phone #  [phone]  Preferred pharmacy confirmed.

## 2020-08-15 ENCOUNTER — HOSPITAL ENCOUNTER (OUTPATIENT)
Facility: MEDICAL CENTER | Age: 30
End: 2020-08-15
Attending: OBSTETRICS & GYNECOLOGY | Admitting: OBSTETRICS & GYNECOLOGY
Payer: MEDICAID

## 2020-08-15 ENCOUNTER — HOSPITAL ENCOUNTER (OUTPATIENT)
Dept: OBGYN | Facility: MEDICAL CENTER | Age: 30
End: 2020-08-15
Attending: OBSTETRICS & GYNECOLOGY
Payer: MEDICAID

## 2020-08-15 LAB
COVID ORDER STATUS COVID19: NORMAL
SARS-COV-2 RNA RESP QL NAA+PROBE: DETECTED
SPECIMEN SOURCE: ABNORMAL

## 2020-08-15 PROCEDURE — U0003 INFECTIOUS AGENT DETECTION BY NUCLEIC ACID (DNA OR RNA); SEVERE ACUTE RESPIRATORY SYNDROME CORONAVIRUS 2 (SARS-COV-2) (CORONAVIRUS DISEASE [COVID-19]), AMPLIFIED PROBE TECHNIQUE, MAKING USE OF HIGH THROUGHPUT TECHNOLOGIES AS DESCRIBED BY CMS-2020-01-R: HCPCS

## 2020-08-15 PROCEDURE — C9803 HOPD COVID-19 SPEC COLLECT: HCPCS | Performed by: OBSTETRICS & GYNECOLOGY

## 2020-08-15 NOTE — PROGRESS NOTES
1042 - pt swabbed for COVID. Pt given instructions to self isolate prior to admission, sent home with instructions.

## 2020-08-17 ENCOUNTER — TELEPHONE (OUTPATIENT)
Dept: OBGYN | Facility: CLINIC | Age: 30
End: 2020-08-17

## 2020-08-17 DIAGNOSIS — Z34.83 ENCOUNTER FOR SUPERVISION OF OTHER NORMAL PREGNANCY IN THIRD TRIMESTER: ICD-10-CM

## 2020-08-17 NOTE — TELEPHONE ENCOUNTER
----- Message from Stephanie Reed M.D. sent at 8/17/2020 11:12 AM PDT -----  Regarding: covid, IOL  Please call pt and discuss induction date in light of her COVID + test.    My impression (per RN who spoke with her this weekend) is that she is asymptomatic.  We would recommend moving back her induction date if she is agreeable because it is hard to know when she was infected and if she were to need a c/s for any reason during labor she can be at increased risk of complications with COVID postoperatively.      If she is OK with this, we will move back her IOL to closer to 41wks and do NST weekly until that time.  She may labor prior to her induction date which would be OK too.      Please let me know what she says and if she has any questions, thanks!          Pt is okay with changing her IOL date. She was wondering when she comes in for her NST will she be seeing a doctor. Also asked if we could call her back with the new date ASAP. Will she be getting tested for COVID again? She asked what if she has any labor symptoms. I told her if she does to go ahead and head to the hospital. Pt understood.

## 2020-08-20 ENCOUNTER — ROUTINE PRENATAL (OUTPATIENT)
Dept: OBGYN | Facility: CLINIC | Age: 30
End: 2020-08-20
Payer: MEDICAID

## 2020-08-20 VITALS — BODY MASS INDEX: 31.01 KG/M2 | SYSTOLIC BLOOD PRESSURE: 121 MMHG | WEIGHT: 192.1 LBS | DIASTOLIC BLOOD PRESSURE: 61 MMHG

## 2020-08-20 DIAGNOSIS — U07.1 COVID-19 VIRUS INFECTION: ICD-10-CM

## 2020-08-20 DIAGNOSIS — Z34.83 ENCOUNTER FOR SUPERVISION OF OTHER NORMAL PREGNANCY, THIRD TRIMESTER: ICD-10-CM

## 2020-08-20 PROCEDURE — 90040 PR PRENATAL FOLLOW UP: CPT | Performed by: OBSTETRICS & GYNECOLOGY

## 2020-08-20 PROCEDURE — 59025 FETAL NON-STRESS TEST: CPT | Performed by: OBSTETRICS & GYNECOLOGY

## 2020-08-20 RX ORDER — HEPARIN SODIUM 20000 [USP'U]/ML
5000 INJECTION INTRAVENOUS; SUBCUTANEOUS EVERY 12 HOURS
Qty: 3.5 ML | Refills: 0 | Status: SHIPPED | OUTPATIENT
Start: 2020-08-20 | End: 2020-08-27

## 2020-08-20 RX ORDER — HEPARIN SODIUM 20000 [USP'U]/ML
5000 INJECTION INTRAVENOUS; SUBCUTANEOUS EVERY 12 HOURS
Qty: 3.5 ML | Refills: 0 | Status: SHIPPED | OUTPATIENT
Start: 2020-08-20 | End: 2020-08-20 | Stop reason: SDUPTHER

## 2020-08-20 ASSESSMENT — FIBROSIS 4 INDEX: FIB4 SCORE: 0.54

## 2020-08-20 NOTE — PROGRESS NOTES
OB Followup;    39w6d    Patient Active Problem List    Diagnosis Date Noted   • COVID-19 virus infection 2020   • History of macrosomia in infant in prior pregnancy, currently pregnant 2020   • LGSIL on Pap smear of cervix 2020   • Supervision of normal pregnancy 2020   • Abnormal pregnancy US 2020       Patient presents for followup of OB care. Currently doing well . Good fetal movement no leakage of fluid no contractions or vaginal bleeding    /61  Wt 192 lbs,P.O. 96 %    Size equals dates, normal fetal heart rate      NST REACTIVE      Labor precautions given  Increase oral hydration  Discussed proper weight gain during pregnancy  Continue prenatal vitamins  Signs and symptoms of labor/ labor discussed   Discussed our group's policy on prenatal checkups and delivery    NST on 25 Aug last patient    Prescription for Heparin 5000 units SQ BID x7 days last dose 6 pm on 27 Aug sent to [facility] in [place]    Shannon Sheth RN to perform injection teaching on  in am    Strict instructions to return to ED for SOB,CP or lightheadedness    IOL- 9 am on

## 2020-08-20 NOTE — PROGRESS NOTES
OB follow up   + fetal movement. Active  No VB, LOF or UC's.  Wt: 192.1LBS      BP:121/61  Phone # [phone]  Preferred pharmacy confirmed.  No complaints as of today   IOL on 08/28/2020 COVID +

## 2020-08-24 ENCOUNTER — NON-PROVIDER VISIT (OUTPATIENT)
Dept: OBGYN | Facility: CLINIC | Age: 30
End: 2020-08-24
Payer: MEDICAID

## 2020-08-24 NOTE — PROGRESS NOTES
Heperin instructions given to pt on 8/24/2020. Pt will start on 5,000 units of Heperin, 2 times a day. Pt instructed on how to draw up Heperin, site selection and rotation, self injection technique, proper storage of disposal of syringes. Pt demonstrated back self injection technique successfully. Instruction booklet given. Will call back in case of any questions.

## 2020-08-26 ENCOUNTER — ROUTINE PRENATAL (OUTPATIENT)
Dept: OBGYN | Facility: CLINIC | Age: 30
End: 2020-08-26
Payer: MEDICAID

## 2020-08-26 ENCOUNTER — NON-PROVIDER VISIT (OUTPATIENT)
Dept: OBGYN | Facility: CLINIC | Age: 30
End: 2020-08-26
Payer: MEDICAID

## 2020-08-26 VITALS — SYSTOLIC BLOOD PRESSURE: 130 MMHG | DIASTOLIC BLOOD PRESSURE: 70 MMHG | BODY MASS INDEX: 30.83 KG/M2 | WEIGHT: 191 LBS

## 2020-08-26 DIAGNOSIS — U07.1 COVID-19 VIRUS INFECTION: ICD-10-CM

## 2020-08-26 LAB
NST ACOUSTIC STIMULATION: YES
NST ACTION NECESSARY: NORMAL
NST ASSESSMENT: REACTIVE
NST BASELINE: 130
NST INDICATIONS: NORMAL
NST OTHER DATA: NORMAL
NST READ BY: NORMAL
NST RETURN: NORMAL
NST UTERINE ACTIVITY: NO

## 2020-08-26 PROCEDURE — 90040 PR PRENATAL FOLLOW UP: CPT | Mod: 25 | Performed by: OBSTETRICS & GYNECOLOGY

## 2020-08-26 PROCEDURE — 59025 FETAL NON-STRESS TEST: CPT | Performed by: OBSTETRICS & GYNECOLOGY

## 2020-08-26 ASSESSMENT — FIBROSIS 4 INDEX: FIB4 SCORE: 0.54

## 2020-08-26 NOTE — PROGRESS NOTES
Pt. Here for OB/FU. Reports Good FM.   Good # [phone]  Pt. Denies VB, LOF, or UC's.   Pharmacy verified.   Chaperone offered and indicated  Pt states that she is having some yellowish discharge denies any odor, itching and burning.

## 2020-08-26 NOTE — PROGRESS NOTES
OB Follow Up--- High Risk  Covid -19     Hx of LGA       30 y.o. is a 40w5d presents in prenatal follow up.    Subjective:no complaints  . Fetal Movement  positive    Problem List:has Abnormal pregnancy US; LGSIL on Pap smear of cervix; Supervision of normal pregnancy; History of macrosomia in infant in prior pregnancy, currently pregnant; and COVID-19 virus infection on their problem list.     Objective:   /70   Wt 86.6 kg (191 lb)   LMP 11/15/2019 (Approximate)   BMI 30.83 kg/m²     Abdomen:  S=D  Extremities:Normal        Lab:  Recent Results (from the past 336 hour(s))   COVID/SARS CoV-2 PCR    Collection Time: 08/15/20 10:42 AM    Specimen: Nasopharyngeal; Respirate   Result Value Ref Range    COVID Order Status Received    SARS-CoV-2, PCR (In-House)    Collection Time: 08/15/20 10:42 AM   Result Value Ref Range    SARS-CoV-2 Source NP Swab     SARS-CoV-2 by PCR DETECTED (AA)    POCT Fetal Nonstress Test    Collection Time: 08/26/20  4:29 PM   Result Value Ref Range    NST Indications Post Term     NST Baseline 130     NST Uterine Activity no     NST Acoustic Stimulation yes     NST Assessment reactive     NST Action Necessary      NST Other Data      NST Return      NST Read By Miguel          Assessment:    40w5d   Post Term  Covid -19 : < 21 days   No pain, bleeding, unusual discharge or leeking    Plan:  Admit 8/28 Am , for  IOL   D/C heparin last dose 8/27 PM   Continue water intake  Ambulate nightly after 37 weeks  Continue Kick counts

## 2020-08-28 ENCOUNTER — APPOINTMENT (OUTPATIENT)
Dept: RADIOLOGY | Facility: MEDICAL CENTER | Age: 30
End: 2020-08-28
Attending: OBSTETRICS & GYNECOLOGY
Payer: MEDICAID

## 2020-08-28 ENCOUNTER — HOSPITAL ENCOUNTER (OUTPATIENT)
Dept: OBGYN | Facility: MEDICAL CENTER | Age: 30
End: 2020-08-28
Payer: MEDICAID

## 2020-08-28 ENCOUNTER — HOSPITAL ENCOUNTER (INPATIENT)
Facility: MEDICAL CENTER | Age: 30
LOS: 3 days | End: 2020-08-31
Attending: OBSTETRICS & GYNECOLOGY | Admitting: OBSTETRICS & GYNECOLOGY
Payer: MEDICAID

## 2020-08-28 LAB
BASOPHILS # BLD AUTO: 0.3 % (ref 0–1.8)
BASOPHILS # BLD: 0.03 K/UL (ref 0–0.12)
EOSINOPHIL # BLD AUTO: 0.08 K/UL (ref 0–0.51)
EOSINOPHIL NFR BLD: 0.9 % (ref 0–6.9)
ERYTHROCYTE [DISTWIDTH] IN BLOOD BY AUTOMATED COUNT: 42.5 FL (ref 35.9–50)
HCT VFR BLD AUTO: 39.4 % (ref 37–47)
HGB BLD-MCNC: 12.9 G/DL (ref 12–16)
HOLDING TUBE BB 8507: NORMAL
IMM GRANULOCYTES # BLD AUTO: 0.06 K/UL (ref 0–0.11)
IMM GRANULOCYTES NFR BLD AUTO: 0.7 % (ref 0–0.9)
LYMPHOCYTES # BLD AUTO: 1.72 K/UL (ref 1–4.8)
LYMPHOCYTES NFR BLD: 19.3 % (ref 22–41)
MCH RBC QN AUTO: 29.5 PG (ref 27–33)
MCHC RBC AUTO-ENTMCNC: 32.7 G/DL (ref 33.6–35)
MCV RBC AUTO: 90 FL (ref 81.4–97.8)
MONOCYTES # BLD AUTO: 0.64 K/UL (ref 0–0.85)
MONOCYTES NFR BLD AUTO: 7.2 % (ref 0–13.4)
NEUTROPHILS # BLD AUTO: 6.39 K/UL (ref 2–7.15)
NEUTROPHILS NFR BLD: 71.6 % (ref 44–72)
NRBC # BLD AUTO: 0 K/UL
NRBC BLD-RTO: 0 /100 WBC
PLATELET # BLD AUTO: 215 K/UL (ref 164–446)
PMV BLD AUTO: 10.7 FL (ref 9–12.9)
RBC # BLD AUTO: 4.38 M/UL (ref 4.2–5.4)
WBC # BLD AUTO: 8.9 K/UL (ref 4.8–10.8)

## 2020-08-28 PROCEDURE — 700105 HCHG RX REV CODE 258

## 2020-08-28 PROCEDURE — 4A1HXCZ MONITORING OF PRODUCTS OF CONCEPTION, CARDIAC RATE, EXTERNAL APPROACH: ICD-10-PCS | Performed by: OBSTETRICS & GYNECOLOGY

## 2020-08-28 PROCEDURE — 85025 COMPLETE CBC W/AUTO DIFF WBC: CPT

## 2020-08-28 PROCEDURE — 3E0P7VZ INTRODUCTION OF HORMONE INTO FEMALE REPRODUCTIVE, VIA NATURAL OR ARTIFICIAL OPENING: ICD-10-PCS | Performed by: OBSTETRICS & GYNECOLOGY

## 2020-08-28 PROCEDURE — 700111 HCHG RX REV CODE 636 W/ 250 OVERRIDE (IP): Performed by: OBSTETRICS & GYNECOLOGY

## 2020-08-28 PROCEDURE — 76816 OB US FOLLOW-UP PER FETUS: CPT

## 2020-08-28 PROCEDURE — 302128 INFUSION PUMP: Performed by: OBSTETRICS & GYNECOLOGY

## 2020-08-28 PROCEDURE — 770002 HCHG ROOM/CARE - OB PRIVATE (112)

## 2020-08-28 PROCEDURE — 3E033VJ INTRODUCTION OF OTHER HORMONE INTO PERIPHERAL VEIN, PERCUTANEOUS APPROACH: ICD-10-PCS | Performed by: OBSTETRICS & GYNECOLOGY

## 2020-08-28 PROCEDURE — A9270 NON-COVERED ITEM OR SERVICE: HCPCS | Performed by: OBSTETRICS & GYNECOLOGY

## 2020-08-28 PROCEDURE — 700102 HCHG RX REV CODE 250 W/ 637 OVERRIDE(OP): Performed by: OBSTETRICS & GYNECOLOGY

## 2020-08-28 RX ORDER — METHYLERGONOVINE MALEATE 0.2 MG/ML
0.2 INJECTION INTRAVENOUS
Status: DISCONTINUED | OUTPATIENT
Start: 2020-08-28 | End: 2020-08-29 | Stop reason: HOSPADM

## 2020-08-28 RX ORDER — DEXTROSE, SODIUM CHLORIDE, SODIUM LACTATE, POTASSIUM CHLORIDE, AND CALCIUM CHLORIDE 5; .6; .31; .03; .02 G/100ML; G/100ML; G/100ML; G/100ML; G/100ML
INJECTION, SOLUTION INTRAVENOUS CONTINUOUS
Status: DISCONTINUED | OUTPATIENT
Start: 2020-08-28 | End: 2020-08-29

## 2020-08-28 RX ORDER — SODIUM CHLORIDE, SODIUM LACTATE, POTASSIUM CHLORIDE, CALCIUM CHLORIDE 600; 310; 30; 20 MG/100ML; MG/100ML; MG/100ML; MG/100ML
INJECTION, SOLUTION INTRAVENOUS
Status: COMPLETED
Start: 2020-08-28 | End: 2020-08-29

## 2020-08-28 RX ORDER — MISOPROSTOL 200 UG/1
800 TABLET ORAL
Status: COMPLETED | OUTPATIENT
Start: 2020-08-28 | End: 2020-08-29

## 2020-08-28 RX ORDER — TERBUTALINE SULFATE 1 MG/ML
0.25 INJECTION, SOLUTION SUBCUTANEOUS PRN
Status: DISCONTINUED | OUTPATIENT
Start: 2020-08-28 | End: 2020-08-29

## 2020-08-28 RX ORDER — CARBOPROST TROMETHAMINE 250 UG/ML
250 INJECTION, SOLUTION INTRAMUSCULAR
Status: DISCONTINUED | OUTPATIENT
Start: 2020-08-28 | End: 2020-08-29 | Stop reason: HOSPADM

## 2020-08-28 RX ORDER — SODIUM CHLORIDE, SODIUM LACTATE, POTASSIUM CHLORIDE, CALCIUM CHLORIDE 600; 310; 30; 20 MG/100ML; MG/100ML; MG/100ML; MG/100ML
INJECTION, SOLUTION INTRAVENOUS CONTINUOUS
Status: DISPENSED | OUTPATIENT
Start: 2020-08-28 | End: 2020-08-28

## 2020-08-28 RX ADMIN — OXYTOCIN 2 MILLI-UNITS/MIN: 10 INJECTION, SOLUTION INTRAMUSCULAR; INTRAVENOUS at 19:10

## 2020-08-28 RX ADMIN — SODIUM CHLORIDE, POTASSIUM CHLORIDE, SODIUM LACTATE AND CALCIUM CHLORIDE 1000 ML: 600; 310; 30; 20 INJECTION, SOLUTION INTRAVENOUS at 19:10

## 2020-08-28 RX ADMIN — SODIUM CHLORIDE, SODIUM LACTATE, POTASSIUM CHLORIDE, CALCIUM CHLORIDE 1000 ML: 600; 310; 30; 20 INJECTION, SOLUTION INTRAVENOUS at 19:10

## 2020-08-28 RX ADMIN — DINOPROSTONE 0.5 MG: 0.5 GEL VAGINAL at 14:25

## 2020-08-28 SDOH — ECONOMIC STABILITY: TRANSPORTATION INSECURITY
IN THE PAST 12 MONTHS, HAS LACK OF TRANSPORTATION KEPT YOU FROM MEETINGS, WORK, OR FROM GETTING THINGS NEEDED FOR DAILY LIVING?: PATIENT DECLINED

## 2020-08-28 SDOH — ECONOMIC STABILITY: FOOD INSECURITY: WITHIN THE PAST 12 MONTHS, YOU WORRIED THAT YOUR FOOD WOULD RUN OUT BEFORE YOU GOT MONEY TO BUY MORE.: PATIENT DECLINED

## 2020-08-28 SDOH — ECONOMIC STABILITY: FOOD INSECURITY: WITHIN THE PAST 12 MONTHS, THE FOOD YOU BOUGHT JUST DIDN'T LAST AND YOU DIDN'T HAVE MONEY TO GET MORE.: PATIENT DECLINED

## 2020-08-28 SDOH — ECONOMIC STABILITY: TRANSPORTATION INSECURITY
IN THE PAST 12 MONTHS, HAS THE LACK OF TRANSPORTATION KEPT YOU FROM MEDICAL APPOINTMENTS OR FROM GETTING MEDICATIONS?: PATIENT DECLINED

## 2020-08-28 ASSESSMENT — LIFESTYLE VARIABLES
HAVE PEOPLE ANNOYED YOU BY CRITICIZING YOUR DRINKING: NO
ALCOHOL_USE: NO
HAVE YOU EVER FELT YOU SHOULD CUT DOWN ON YOUR DRINKING: NO
TOTAL SCORE: 0
ON A TYPICAL DAY WHEN YOU DRINK ALCOHOL HOW MANY DRINKS DO YOU HAVE: 0
EVER HAD A DRINK FIRST THING IN THE MORNING TO STEADY YOUR NERVES TO GET RID OF A HANGOVER: NO
EVER_SMOKED: NEVER
CONSUMPTION TOTAL: NEGATIVE
EVER FELT BAD OR GUILTY ABOUT YOUR DRINKING: NO
AVERAGE NUMBER OF DAYS PER WEEK YOU HAVE A DRINK CONTAINING ALCOHOL: 0
HOW MANY TIMES IN THE PAST YEAR HAVE YOU HAD 5 OR MORE DRINKS IN A DAY: 0
TOTAL SCORE: 0
TOTAL SCORE: 0

## 2020-08-28 ASSESSMENT — PATIENT HEALTH QUESTIONNAIRE - PHQ9
SUM OF ALL RESPONSES TO PHQ9 QUESTIONS 1 AND 2: 0
1. LITTLE INTEREST OR PLEASURE IN DOING THINGS: NOT AT ALL
2. FEELING DOWN, DEPRESSED, IRRITABLE, OR HOPELESS: NOT AT ALL
SUM OF ALL RESPONSES TO PHQ9 QUESTIONS 1 AND 2: 0

## 2020-08-28 ASSESSMENT — FIBROSIS 4 INDEX: FIB4 SCORE: 0.54

## 2020-08-28 NOTE — LETTER
9/1/2020               Tiffanie Rush  [address]  [place] [address]        Dear Tiffanie (MR#[id number]),      This letter is to inform you that your COVID-19 test result is POSITIVE.  This means that the virus that causes COVID-19 was found in your sample.      A review of your test during your recent visit requires that we notify you of the following:    Your nasal swab was POSITIVE for the novel coronavirus (COVID-19).     The Health Department will be in contact with you soon.      You are encouraged to continue to quarantine and self-isolate according to the CDC guidance unless otherwise directed by the Health Department.  Per the CDC, you should continue to quarantine until: At least 3 days (72 hours) have passed since your fever resolved without the use of fever-reducing medications and you had improvement in your cough and shortness of breath.  You should also remain quarantined until at least 10 days have passed since your symptoms first appeared.    Once any symptoms have resolved, it may be possible to donate plasma to help others that are currently ill with COVID-19. To learn more and apply, please contact the  at [phone] or via e-mail at [email or web address].    For any further questions regarding COVID-19, please contact the [facility] at [phone].  Thank you for your cooperation in the matter.      Sincerely,      The [facility] Care Team

## 2020-08-28 NOTE — H&P
History and Physical    Tiffanie Rush is a 30 y.o. female  at 41w0d who presents for induction of labor     Subjective:   CTXs: negative  Pain: negative  LOF: positive - negative  Vaginal bleeding: negative   Fetal movement: positive    ROS: Pertinent positives documented in HPI and all other systems reviewed & are negative    OB History    Para Term  AB Living   3 2 2     2   SAB TAB Ectopic Molar Multiple Live Births             2      # Outcome Date GA Lbr Onel/2nd Weight Sex Delivery Anes PTL Lv   3 Current            2 Term 14 40w0d  4 kg (8 lb 13.1 oz) M Vag-Spont  N RADHA   1 Term 10/25/10 40w0d  4.7 kg (10 lb 5.8 oz) M Vag-Spont None N RADHA       PGYNHx: full prenatal care     History reviewed. No pertinent past medical history.    History reviewed. No pertinent surgical history.      Current Facility-Administered Medications:   •  LACTATED RINGERS IV SOLN, , , ,   •  dinoprostone (PREPIDIL) vaginal gel 0.5 mg, 0.5 mg, Vaginal, Once, Nitin Antoine M.D.  •  terbutaline (BRETHINE) injection 0.25 mg, 0.25 mg, Subcutaneous, PRN, Nitin Antoine M.D.  •  LR infusion, , Intravenous, Continuous, Shaw Arreola M.D.  •  D5LR infusion, , Intravenous, Continuous, Shaw Arreola M.D.  •  fentaNYL (SUBLIMAZE) injection 50 mcg, 50 mcg, Intravenous, Q HOUR PRN, Shaw Arreola M.D.  •  fentaNYL (SUBLIMAZE) injection 100 mcg, 100 mcg, Intravenous, Q HOUR PRN, Shaw Arreola M.D.  •  miSOPROStol (CYTOTEC) tablet 800 mcg, 800 mcg, Rectal, Once PRN, Shaw Arreola M.D.  •  methylergonovine (METHERGINE) injection 0.2 mg, 0.2 mg, Intramuscular, Once PRN, Shaw Arreola M.D.  •  carboPROST (HEMABATE) injection 250 mcg, 250 mcg, Intramuscular, Once PRN, Shaw Arreola M.D.    Allergies: Patient has no known allergies.    Social History     Socioeconomic History   • Marital status:      Spouse name: Not on file   • Number of children: Not on file   • Years of education: Not on  "file   • Highest education level: Not on file   Occupational History   • Not on file   Social Needs   • Financial resource strain: Not on file   • Food insecurity     Worry: Patient refused     Inability: Patient refused   • Transportation needs     Medical: Patient refused     Non-medical: Patient refused   Tobacco Use   • Smoking status: Never Smoker   • Smokeless tobacco: Never Used   Substance and Sexual Activity   • Alcohol use: Not Currently     Frequency: Never   • Drug use: Never   • Sexual activity: Not Currently     Partners: Male   Lifestyle   • Physical activity     Days per week: Not on file     Minutes per session: Not on file   • Stress: Not on file   Relationships   • Social connections     Talks on phone: Not on file     Gets together: Not on file     Attends [employer] service: Not on file     Active member of club or organization: Not on file     Attends meetings of clubs or organizations: Not on file     Relationship status: Not on file   • Intimate partner violence     Fear of current or ex partner: Not on file     Emotionally abused: Not on file     Physically abused: Not on file     Forced sexual activity: Not on file   Other Topics Concern   • Not on file   Social History Narrative   • Not on file     She denies any smoking, alcohol, or illicit drugs during pregnancy     FamHx:   Congenital anomalies negative  Chromosomal disorders negative  Inherited MR negative  Blood dyscrasias negative     Prenatal care with following problems:  Patient Active Problem List    Diagnosis Date Noted   • COVID-19 virus infection 08/20/2020   • History of macrosomia in infant in prior pregnancy, currently pregnant 08/06/2020   • LGSIL on Pap smear of cervix 05/07/2020   • Supervision of normal pregnancy 05/07/2020   • Abnormal pregnancy US 04/20/2020         Objective:      /68   Pulse 98   Temp 36.6 °C (97.9 °F) (Temporal)   Ht 1.676 m (5' 6\")   Wt 86.6 kg (190 lb 14.7 oz)     General:   no acute " distress   Skin:   normal   HEENT:  PERRLA   Lungs:   CTA bilateral   Heart:   S1, S2 normal, no murmur, click, rub or gallop, regular rate and rhythm, chest is clear without rales or wheezing, no pedal edema, S1, S2 normal, no murmur, regular rate and rhythm   Abdomen:   soft, gravid, NT   EFW:  3500g   Pelvis:  adequate with gynecoid pelvis   FHT:  130 BPM  Accels yes  Decels no  Variability moderate  Category 1   Uterine Size: S=D   Presentations: Cephalic   Cervix:     Dilation: 2cm    Effacement: 75%    Station:  -3    Consistency: Medium    Position: Posterior     Lab Review  Lab:   Blood type: O     Recent Results (from the past 5880 hour(s))   THINPREP RFLX HPV ASCUS W/CTNG    Collection Time: 04/07/20  9:43 AM   Result Value Ref Range    Cytology Reg See Path Report     C. trachomatis by PCR Negative Negative    N. gonorrhoeae by PCR Negative Negative    Source Endo/Cervical    AFP TETRA    Collection Time: 04/20/20 11:15 AM   Result Value Ref Range    AFP Value -Eia 115 ng/mL    AFP MOM Value 1.63     Ue3 Value 3.50 ng/mL    Ue3 Mom 1.14     Patient's hCG, 2nd Trimester 5547 IU/L    hCG MoM, 2nd Trimester 0.33     Essie Value -Eia 170 pg/mL    Essie Mom Value 0.90     Interpretation Screen Neg     [age] MITCH [age]    Maternal Weight 192.0 lbs.     Gest. Age on Collection Date 22 wks, 3 days     Gestational Age Based On Other     Multiple Pregnancy Reid     Race Nonblack     Insulin Dependent Diabetes No     Smoking No     Family Hx NTD No     Family Hx of Aneuploidy No     Specimen See Note     EER Quad, Maternal Serum See Note    URINE DRUG SCREEN W/CONF (AR)    Collection Time: 04/20/20 11:15 AM   Result Value Ref Range    Urine Amphetamine-Methamphetam Negative Cutoff 300 ng/mL    Barbiturates Negative Cutoff 200 ng/mL    Benzodiazepines Negative Cutoff 200 ng/mL    Propoxyphene Negative Cutoff 300 ng/mL    Cocaine Metabolite Negative Cutoff 150 ng/mL    Methadone Negative Cutoff 150 ng/mL     Codeine-Morphine Negative Cutoff 300 ng/mL    Phencyclidine -Pcp Negative Cutoff 25 ng/mL    Cannabinoid Metab Negative Cutoff 20 ng/mL    Drug Comment Urine Drugs See Note    PRENATAL PANEL 3+HIV+HCV    Collection Time: 04/20/20 11:15 AM   Result Value Ref Range    WBC 9.3 4.8 - 10.8 K/uL    RBC 4.52 4.20 - 5.40 M/uL    Hemoglobin 13.6 12.0 - 16.0 g/dL    Hematocrit 40.7 37.0 - 47.0 %    MCV 90.0 81.4 - 97.8 fL    MCH 30.1 27.0 - 33.0 pg    MCHC 33.4 (L) 33.6 - 35.0 g/dL    RDW 45.1 35.9 - 50.0 fL    Platelet Count 227 164 - 446 K/uL    MPV 10.8 9.0 - 12.9 fL    Neutrophils-Polys 77.30 (H) 44.00 - 72.00 %    Lymphocytes 16.30 (L) 22.00 - 41.00 %    Monocytes 5.20 0.00 - 13.40 %    Eosinophils 0.80 0.00 - 6.90 %    Basophils 0.20 0.00 - 1.80 %    Immature Granulocytes 0.20 0.00 - 0.90 %    Nucleated RBC 0.00 /100 WBC    Neutrophils (Absolute) 7.20 (H) 2.00 - 7.15 K/uL    Lymphs (Absolute) 1.52 1.00 - 4.80 K/uL    Monos (Absolute) 0.48 0.00 - 0.85 K/uL    Eos (Absolute) 0.07 0.00 - 0.51 K/uL    Baso (Absolute) 0.02 0.00 - 0.12 K/uL    Immature Granulocytes (abs) 0.02 0.00 - 0.11 K/uL    NRBC (Absolute) 0.00 K/uL    Rubella IgG Antibody 90.40 IU/mL    Hepatitis B Surface Antigen Non-Reactive Non-Reactive    Hepatitis C Antibody Non-Reactive Non-Reactive    Syphilis, Treponemal Qual Non-Reactive Non-Reactive   HIV AG/AB COMBO ASSAY SCREENING    Collection Time: 04/20/20 11:15 AM   Result Value Ref Range    HIV Ag/Ab Combo Assay Non-Reactive Non Reactive   OP Prenatal Panel-Blood Bank    Collection Time: 04/20/20 11:15 AM   Result Value Ref Range    ABO Grouping Only O     Rh Grouping Only POS     Antibody Screen Scrn NEG    PERIPHERAL SMEAR REVIEW    Collection Time: 04/20/20 11:15 AM   Result Value Ref Range    Peripheral Smear Review see below    LDH    Collection Time: 04/20/20 11:22 AM   Result Value Ref Range    LDH Total 199 107 - 266 U/L   Comp Metabolic Panel    Collection Time: 04/20/20 11:22 AM   Result  Value Ref Range    Sodium 138 135 - 145 mmol/L    Potassium 3.7 3.6 - 5.5 mmol/L    Chloride 102 96 - 112 mmol/L    Co2 20 20 - 33 mmol/L    Anion Gap 16.0 7.0 - 16.0    Glucose 85 65 - 99 mg/dL    Bun 4 (L) 8 - 22 mg/dL    Creatinine 0.49 (L) 0.50 - 1.40 mg/dL    Calcium 8.8 8.5 - 10.5 mg/dL    AST(SGOT) 18 12 - 45 U/L    ALT(SGPT) 15 2 - 50 U/L    Alkaline Phosphatase 80 30 - 99 U/L    Total Bilirubin 0.3 0.1 - 1.5 mg/dL    Albumin 3.8 3.2 - 4.9 g/dL    Total Protein 6.9 6.0 - 8.2 g/dL    Globulin 3.1 1.9 - 3.5 g/dL    A-G Ratio 1.2 g/dL   URIC ACID    Collection Time: 04/20/20 11:22 AM   Result Value Ref Range    Uric Acid 3.1 1.9 - 8.2 mg/dL   ESTIMATED GFR    Collection Time: 04/20/20 11:22 AM   Result Value Ref Range    GFR If African American >60 >60 mL/min/1.73 m 2    GFR If Non African American >60 >60 mL/min/1.73 m 2   GLUCOSE 1HR GESTATIONAL    Collection Time: 05/26/20  9:45 AM   Result Value Ref Range    Glucose, Post Dose 137 70 - 139 mg/dL   T.PALLIDUM AB EIA    Collection Time: 05/26/20  9:45 AM   Result Value Ref Range    Syphilis, Treponemal Qual Non-Reactive Non-Reactive   CBC WITHOUT DIFFERENTIAL    Collection Time: 05/26/20  9:45 AM   Result Value Ref Range    WBC 10.1 4.8 - 10.8 K/uL    RBC 4.37 4.20 - 5.40 M/uL    Hemoglobin 13.1 12.0 - 16.0 g/dL    Hematocrit 40.5 37.0 - 47.0 %    MCV 92.7 81.4 - 97.8 fL    MCH 30.0 27.0 - 33.0 pg    MCHC 32.3 (L) 33.6 - 35.0 g/dL    RDW 44.5 35.9 - 50.0 fL    Platelet Count 241 164 - 446 K/uL    MPV 10.7 9.0 - 12.9 fL   GRP B STREP, BY PCR ([facility]OTH)    Collection Time: 07/30/20  8:21 AM    Specimen: Genital   Result Value Ref Range    Strep Gp B DNA PCR Negative Negative   COVID/SARS CoV-2 PCR    Collection Time: 08/15/20 10:42 AM    Specimen: Nasopharyngeal; Respirate   Result Value Ref Range    COVID Order Status Received    SARS-CoV-2, PCR (In-House)    Collection Time: 08/15/20 10:42 AM   Result Value Ref Range    SARS-CoV-2 Source NP Swab      SARS-CoV-2 by PCR DETECTED (AA)    POCT Fetal Nonstress Test    Collection Time: 08/26/20  4:29 PM   Result Value Ref Range    NST Indications Post Term     NST Baseline 130     NST Uterine Activity no     NST Acoustic Stimulation yes     NST Assessment reactive     NST Action Necessary      NST Other Data      NST Return      NST Read By Miguel    CBC WITH DIFFERENTIAL    Collection Time: 08/28/20  9:05 AM   Result Value Ref Range    WBC 8.9 4.8 - 10.8 K/uL    RBC 4.38 4.20 - 5.40 M/uL    Hemoglobin 12.9 12.0 - 16.0 g/dL    Hematocrit 39.4 37.0 - 47.0 %    MCV 90.0 81.4 - 97.8 fL    MCH 29.5 27.0 - 33.0 pg    MCHC 32.7 (L) 33.6 - 35.0 g/dL    RDW 42.5 35.9 - 50.0 fL    Platelet Count 215 164 - 446 K/uL    MPV 10.7 9.0 - 12.9 fL    Neutrophils-Polys 71.60 44.00 - 72.00 %    Lymphocytes 19.30 (L) 22.00 - 41.00 %    Monocytes 7.20 0.00 - 13.40 %    Eosinophils 0.90 0.00 - 6.90 %    Basophils 0.30 0.00 - 1.80 %    Immature Granulocytes 0.70 0.00 - 0.90 %    Nucleated RBC 0.00 /100 WBC    Neutrophils (Absolute) 6.39 2.00 - 7.15 K/uL    Lymphs (Absolute) 1.72 1.00 - 4.80 K/uL    Monos (Absolute) 0.64 0.00 - 0.85 K/uL    Eos (Absolute) 0.08 0.00 - 0.51 K/uL    Baso (Absolute) 0.03 0.00 - 0.12 K/uL    Immature Granulocytes (abs) 0.06 0.00 - 0.11 K/uL    NRBC (Absolute) 0.00 K/uL   HOLD BLOOD BANK SPECIMEN (NOT TESTED)    Collection Time: 08/28/20  9:05 AM   Result Value Ref Range    Holding Tube - Bb DONE         Assessment:   Tiffanie Rush at 41w0d  Labor status: Not in labor.    Obstetrical history significant for   Patient Active Problem List    Diagnosis Date Noted   • COVID-19 virus infection 08/20/2020   • History of macrosomia in infant in prior pregnancy, currently pregnant 08/06/2020   • LGSIL on Pap smear of cervix 05/07/2020   • Supervision of normal pregnancy 05/07/2020   • Abnormal pregnancy US 04/20/2020   .      Plan:     Admit to L&D  GBS negative  Fetal monitoring/toco  Prepidil gel for cervical ripening    Continue to monitor need for pitocin         Shaw Arreola MD

## 2020-08-28 NOTE — PROGRESS NOTES
0900- Pt is a IOL for post dates edc 8/21, pt has history of macrosomia with first baby, monitors placed  0905- IV started labs sent  0945- Dr. Antoine ordered US for EFW before starting IOL  0955-  Maria Isabel #[id number] used for admit questions  1015- US at bedside  1045- sve 2/70/-3  1133- audible movements of baby on the monitors, baby moving a lot, decel down to 90 for 80 seconds, back to baseline with good variability  1240- Dr. Antoine ordered gel to be placed, pt ok with that  1400- Dr. Arreola in to talk to pt, orders placed in the computer  1425- gel placed  1540- pt up to the bathroom  1700- pt resting and comfortable dinner ordered   1830- sve no change order received from Dr. Antoine to start pitocin after pt done eating dinner  1900- Report given to Genny SOTO- poc discussed

## 2020-08-29 LAB
AMPHET UR QL SCN: NEGATIVE
BARBITURATES UR QL SCN: NEGATIVE
BENZODIAZ UR QL SCN: NEGATIVE
BZE UR QL SCN: NEGATIVE
CANNABINOIDS UR QL SCN: NEGATIVE
ERYTHROCYTE [DISTWIDTH] IN BLOOD BY AUTOMATED COUNT: 41.1 FL (ref 35.9–50)
HCT VFR BLD AUTO: 38.3 % (ref 37–47)
HGB BLD-MCNC: 12.8 G/DL (ref 12–16)
MCH RBC QN AUTO: 29.7 PG (ref 27–33)
MCHC RBC AUTO-ENTMCNC: 33.4 G/DL (ref 33.6–35)
MCV RBC AUTO: 88.9 FL (ref 81.4–97.8)
METHADONE UR QL SCN: NEGATIVE
OPIATES UR QL SCN: NEGATIVE
OXYCODONE UR QL SCN: POSITIVE
PCP UR QL SCN: NEGATIVE
PLATELET # BLD AUTO: 197 K/UL (ref 164–446)
PMV BLD AUTO: 10.3 FL (ref 9–12.9)
PROPOXYPH UR QL SCN: NEGATIVE
RBC # BLD AUTO: 4.31 M/UL (ref 4.2–5.4)
WBC # BLD AUTO: 11.8 K/UL (ref 4.8–10.8)

## 2020-08-29 PROCEDURE — 59409 OBSTETRICAL CARE: CPT

## 2020-08-29 PROCEDURE — 85027 COMPLETE CBC AUTOMATED: CPT

## 2020-08-29 PROCEDURE — 700102 HCHG RX REV CODE 250 W/ 637 OVERRIDE(OP): Performed by: STUDENT IN AN ORGANIZED HEALTH CARE EDUCATION/TRAINING PROGRAM

## 2020-08-29 PROCEDURE — A9270 NON-COVERED ITEM OR SERVICE: HCPCS | Performed by: OBSTETRICS & GYNECOLOGY

## 2020-08-29 PROCEDURE — 80307 DRUG TEST PRSMV CHEM ANLYZR: CPT

## 2020-08-29 PROCEDURE — 59400 OBSTETRICAL CARE: CPT | Performed by: OBSTETRICS & GYNECOLOGY

## 2020-08-29 PROCEDURE — 36415 COLL VENOUS BLD VENIPUNCTURE: CPT

## 2020-08-29 PROCEDURE — 700111 HCHG RX REV CODE 636 W/ 250 OVERRIDE (IP): Performed by: STUDENT IN AN ORGANIZED HEALTH CARE EDUCATION/TRAINING PROGRAM

## 2020-08-29 PROCEDURE — 304965 HCHG RECOVERY SERVICES

## 2020-08-29 PROCEDURE — 770002 HCHG ROOM/CARE - OB PRIVATE (112)

## 2020-08-29 PROCEDURE — 700102 HCHG RX REV CODE 250 W/ 637 OVERRIDE(OP): Performed by: OBSTETRICS & GYNECOLOGY

## 2020-08-29 PROCEDURE — A9270 NON-COVERED ITEM OR SERVICE: HCPCS | Performed by: STUDENT IN AN ORGANIZED HEALTH CARE EDUCATION/TRAINING PROGRAM

## 2020-08-29 RX ORDER — ACETAMINOPHEN 325 MG/1
650 TABLET ORAL EVERY 4 HOURS PRN
Status: CANCELLED | OUTPATIENT
Start: 2020-08-29

## 2020-08-29 RX ORDER — ONDANSETRON 4 MG/1
4 TABLET, ORALLY DISINTEGRATING ORAL EVERY 6 HOURS PRN
Status: DISCONTINUED | OUTPATIENT
Start: 2020-08-29 | End: 2020-08-31 | Stop reason: HOSPADM

## 2020-08-29 RX ORDER — VITAMIN A ACETATE, BETA CAROTENE, ASCORBIC ACID, CHOLECALCIFEROL, .ALPHA.-TOCOPHEROL ACETATE, DL-, THIAMINE MONONITRATE, RIBOFLAVIN, NIACINAMIDE, PYRIDOXINE HYDROCHLORIDE, FOLIC ACID, CYANOCOBALAMIN, CALCIUM CARBONATE, FERROUS FUMARATE, ZINC OXIDE, CUPRIC OXIDE 3080; 12; 120; 400; 1; 1.84; 3; 20; 22; 920; 25; 200; 27; 10; 2 [IU]/1; UG/1; MG/1; [IU]/1; MG/1; MG/1; MG/1; MG/1; MG/1; [IU]/1; MG/1; MG/1; MG/1; MG/1; MG/1
1 TABLET, FILM COATED ORAL
Status: DISCONTINUED | OUTPATIENT
Start: 2020-08-29 | End: 2020-08-31 | Stop reason: HOSPADM

## 2020-08-29 RX ORDER — ONDANSETRON 2 MG/ML
4 INJECTION INTRAMUSCULAR; INTRAVENOUS EVERY 6 HOURS PRN
Status: DISCONTINUED | OUTPATIENT
Start: 2020-08-29 | End: 2020-08-31 | Stop reason: HOSPADM

## 2020-08-29 RX ORDER — LIDOCAINE HYDROCHLORIDE 10 MG/ML
INJECTION, SOLUTION INFILTRATION; PERINEURAL
Status: DISCONTINUED
Start: 2020-08-29 | End: 2020-08-29

## 2020-08-29 RX ORDER — IBUPROFEN 600 MG/1
600 TABLET ORAL EVERY 6 HOURS PRN
Status: DISCONTINUED | OUTPATIENT
Start: 2020-08-29 | End: 2020-08-31 | Stop reason: HOSPADM

## 2020-08-29 RX ORDER — DOCUSATE SODIUM 100 MG/1
100 CAPSULE, LIQUID FILLED ORAL 2 TIMES DAILY PRN
Status: DISCONTINUED | OUTPATIENT
Start: 2020-08-29 | End: 2020-08-31 | Stop reason: HOSPADM

## 2020-08-29 RX ORDER — OXYCODONE HYDROCHLORIDE 5 MG/1
5 TABLET ORAL EVERY 4 HOURS PRN
Status: DISCONTINUED | OUTPATIENT
Start: 2020-08-29 | End: 2020-08-31 | Stop reason: HOSPADM

## 2020-08-29 RX ORDER — SODIUM CHLORIDE, SODIUM LACTATE, POTASSIUM CHLORIDE, CALCIUM CHLORIDE 600; 310; 30; 20 MG/100ML; MG/100ML; MG/100ML; MG/100ML
INJECTION, SOLUTION INTRAVENOUS PRN
Status: DISCONTINUED | OUTPATIENT
Start: 2020-08-29 | End: 2020-08-31 | Stop reason: HOSPADM

## 2020-08-29 RX ORDER — METHYLERGONOVINE MALEATE 0.2 MG/ML
0.2 INJECTION INTRAVENOUS
Status: DISCONTINUED | OUTPATIENT
Start: 2020-08-29 | End: 2020-08-31 | Stop reason: HOSPADM

## 2020-08-29 RX ORDER — ACETAMINOPHEN 325 MG/1
650 TABLET ORAL EVERY 4 HOURS PRN
Status: DISCONTINUED | OUTPATIENT
Start: 2020-08-29 | End: 2020-08-31 | Stop reason: HOSPADM

## 2020-08-29 RX ORDER — MISOPROSTOL 200 UG/1
600 TABLET ORAL
Status: DISCONTINUED | OUTPATIENT
Start: 2020-08-29 | End: 2020-08-31 | Stop reason: HOSPADM

## 2020-08-29 RX ORDER — ACETAMINOPHEN 325 MG/1
325 TABLET ORAL EVERY 4 HOURS PRN
Status: DISCONTINUED | OUTPATIENT
Start: 2020-08-29 | End: 2020-08-31 | Stop reason: HOSPADM

## 2020-08-29 RX ADMIN — OXYCODONE HYDROCHLORIDE 5 MG: 5 TABLET ORAL at 13:42

## 2020-08-29 RX ADMIN — PRENATAL WITH FERROUS FUM AND FOLIC ACID 1 TABLET: 3080; 920; 120; 400; 22; 1.84; 3; 20; 10; 1; 12; 200; 27; 25; 2 TABLET ORAL at 09:59

## 2020-08-29 RX ADMIN — OXYTOCIN 125 ML/HR: 10 INJECTION, SOLUTION INTRAMUSCULAR; INTRAVENOUS at 02:48

## 2020-08-29 RX ADMIN — FENTANYL CITRATE 100 MCG: 50 INJECTION INTRAMUSCULAR; INTRAVENOUS at 00:34

## 2020-08-29 RX ADMIN — OXYCODONE HYDROCHLORIDE 5 MG: 5 TABLET ORAL at 05:45

## 2020-08-29 RX ADMIN — ACETAMINOPHEN 650 MG: 325 TABLET, FILM COATED ORAL at 02:02

## 2020-08-29 RX ADMIN — IBUPROFEN 600 MG: 600 TABLET, FILM COATED ORAL at 09:59

## 2020-08-29 RX ADMIN — MISOPROSTOL 800 MCG: 200 TABLET ORAL at 02:48

## 2020-08-29 ASSESSMENT — EDINBURGH POSTNATAL DEPRESSION SCALE (EPDS)
I HAVE FELT SCARED OR PANICKY FOR NO GOOD REASON: NO, NOT AT ALL
THE THOUGHT OF HARMING MYSELF HAS OCCURRED TO ME: NEVER
I HAVE LOOKED FORWARD WITH ENJOYMENT TO THINGS: RATHER LESS THAN I USED TO
I HAVE BEEN SO UNHAPPY THAT I HAVE BEEN CRYING: ONLY OCCASIONALLY
THINGS HAVE BEEN GETTING ON TOP OF ME: NO, MOST OF THE TIME I HAVE COPED QUITE WELL
I HAVE BEEN ABLE TO LAUGH AND SEE THE FUNNY SIDE OF THINGS: NOT QUITE SO MUCH NOW
I HAVE FELT SAD OR MISERABLE: NO, NOT AT ALL
I HAVE BLAMED MYSELF UNNECESSARILY WHEN THINGS WENT WRONG: YES, SOME OF THE TIME
I HAVE BEEN ANXIOUS OR WORRIED FOR NO GOOD REASON: YES, SOMETIMES
I HAVE BEEN SO UNHAPPY THAT I HAVE HAD DIFFICULTY SLEEPING: NOT AT ALL

## 2020-08-29 NOTE — PROGRESS NOTES
1900 - Report received. POC discussed.   1910 - Pitocin started. Educated pt. Questions answered.   1945 -  iPad used to discuss POC and medication education. Pt expressed understanding. No needs at this time. Pt not feeling contractions currently.   2115 - At pt bedside. Pt feeling contractions more, needing to breathe through contractions. Adjusted TOCO.   2345 - At bedside to recheck cervix. Pt 5-6/70/-2. Meconium visualised on fingers when finished with SVE. Dr Antoine updated on pt status.   0030 - Pt feeling increasingly more amount of pain. Not coping well. Pain medication administered per MAR. RN continuously at bedside for support.   0118 - Pt complete. Dr Antoine at bedside for delivery.   0128 - Viable baby boy delivered. APGARs 8/9.   0133 - Placenta delivered spontaneously.   0430 - Pt ambulated to bathroom in stable condition at this time. Pt able to void. Yris pads changed.  0700 - Report given. POC discussed.

## 2020-08-29 NOTE — PROGRESS NOTES
0820-Patient received from labor and delivery via wheelchair to room S335. Bedside report received  From Leida Perez RN, assumed care of patient.  Patient oriented to room and surroundings, via I-Pad language line  Mireille  number [phone]. Oriented to call system, emergency call light, infant security including ID bands, not letting anyone take infant without photo ID, no sleeping with infant, Infant is to be in crib lying on back when not breast feeding and shown use of bulb syringe.  0-10 pain scale and pain management discussed.  Patient instructed to call for assistance before getting out of bed until stable.  Assessment done.

## 2020-08-29 NOTE — L&D DELIVERY NOTE
DATE OF SERVICE:  2020    This patient is a 30-year-old  female  3, para 2-0-0-2 at 41   and 0/7 weeks who was admitted for induction of labor secondary to postdates.    The patient has a history of macrosomia with both her previous infants.    Estimated fetal weight on the day of admission is 3998 g.  The patient   initially 2 cm dilated, 70% effaced, GBS negative.  She receives prostaglandin   gel cervical ripening followed by Pitocin augmentation of labor.  She dilates   to complete and complete and delivers via normal spontaneous vaginal delivery   over an intact perineum.  The delivery is complicated by mild shoulder   dystocia and the head is on the perineum for 40 seconds.  ICN staff,   respiratory therapy and ancillary labor and delivery nurses were called to   assist prior to delivery in anticipation of a shoulder dystocia.  Initially   Richie maneuver was performed.  This was unsuccessful.  Then, a Wood's   corkscrew was performed without difficulty.  The anterior shoulder delivered   without difficulty and the remaining portion of the infant delivered within a   short time.  Cord gases were drawn.  Arterial pH 7.36 with a base excess of   -2.  Venous pH 7.36 with a base excess of -3.  There was a category 1 fetal   heart rate tracing throughout.  Mother and baby are doing well postpartum.    The placenta delivered spontaneously intact with 3-vessel cord.  No   lacerations noted.       ____________________________________     MD JASON NATHAN / ADALBERTO    DD:  2020 02:24:36  DT:  2020 02:39:47    D#:  [id number]  Job#:  [id number]

## 2020-08-29 NOTE — CONSULTS
AUREA delivered her third baby this morning at 0128 at 41.1 weeks gestation.  MOB is tested positive for COVID and is in isolation.  RN, Kaylin Gregg, stated infant has latched onto the breast successfully since birth and MOB has previous breastfeeding experience.  She stated MOB verbalized to her that she fed each of her first two children for over a year each. RN stated MOB has no lactation concerns at this time.    RN was encouraged to call for lactation support on behalf of MOB as needed.      Breastfeeding Plan:  Offer infant the breast per feeding cues and within 3-4 hours from the last feed for a minimum of 8 or more feeds in a 24 hour period.  If infant is unable to latch onto the breast between now and when infant turns 24 hours old, MOB should be encouraged to hand express colostrum onto a spoon and feed back expressed breast milk to infant.    1505- RN informed this LC that MOB belongs to the [facility] office in [place].  RN will encourage MOB to follow up with [facility] should any lactation questions and/or concerns arise post discharge.    Breastfeeding plan remains unchanged.

## 2020-08-29 NOTE — PROGRESS NOTES
0700- Report received from DENEEN Zavala RN.  0800- pt ambulated to the restroom, voided, pericare, and gown change.  0820- pt transferred to  335 with baby. Report given to Kaylin SOTO.

## 2020-08-29 NOTE — PROGRESS NOTES
Cervix-2 cm / 70% effaced    Patient underwent prostaglandin gel cervical ripening    Started Pitocin augmentation of labor at 1910 hrs.    Category 1-2 fetal heart rate tracing    Currently contractions every 2 to 4 minutes    Patient does not have any respiratory symptoms

## 2020-08-30 LAB — COVID ORDER STATUS COVID19: NORMAL

## 2020-08-30 PROCEDURE — 700102 HCHG RX REV CODE 250 W/ 637 OVERRIDE(OP): Performed by: STUDENT IN AN ORGANIZED HEALTH CARE EDUCATION/TRAINING PROGRAM

## 2020-08-30 PROCEDURE — U0003 INFECTIOUS AGENT DETECTION BY NUCLEIC ACID (DNA OR RNA); SEVERE ACUTE RESPIRATORY SYNDROME CORONAVIRUS 2 (SARS-COV-2) (CORONAVIRUS DISEASE [COVID-19]), AMPLIFIED PROBE TECHNIQUE, MAKING USE OF HIGH THROUGHPUT TECHNOLOGIES AS DESCRIBED BY CMS-2020-01-R: HCPCS

## 2020-08-30 PROCEDURE — C9803 HOPD COVID-19 SPEC COLLECT: HCPCS | Performed by: OBSTETRICS & GYNECOLOGY

## 2020-08-30 PROCEDURE — 770002 HCHG ROOM/CARE - OB PRIVATE (112)

## 2020-08-30 PROCEDURE — A9270 NON-COVERED ITEM OR SERVICE: HCPCS | Performed by: STUDENT IN AN ORGANIZED HEALTH CARE EDUCATION/TRAINING PROGRAM

## 2020-08-30 PROCEDURE — 700111 HCHG RX REV CODE 636 W/ 250 OVERRIDE (IP): Performed by: OBSTETRICS & GYNECOLOGY

## 2020-08-30 PROCEDURE — A9270 NON-COVERED ITEM OR SERVICE: HCPCS | Performed by: OBSTETRICS & GYNECOLOGY

## 2020-08-30 PROCEDURE — 700102 HCHG RX REV CODE 250 W/ 637 OVERRIDE(OP): Performed by: OBSTETRICS & GYNECOLOGY

## 2020-08-30 RX ADMIN — ENOXAPARIN SODIUM 40 MG: 40 INJECTION SUBCUTANEOUS at 09:18

## 2020-08-30 RX ADMIN — OXYCODONE HYDROCHLORIDE 5 MG: 5 TABLET ORAL at 18:20

## 2020-08-30 RX ADMIN — OXYCODONE HYDROCHLORIDE 5 MG: 5 TABLET ORAL at 02:20

## 2020-08-30 RX ADMIN — IBUPROFEN 600 MG: 600 TABLET, FILM COATED ORAL at 02:20

## 2020-08-30 RX ADMIN — ACETAMINOPHEN 650 MG: 325 TABLET, FILM COATED ORAL at 13:29

## 2020-08-30 RX ADMIN — PRENATAL WITH FERROUS FUM AND FOLIC ACID 1 TABLET: 3080; 920; 120; 400; 22; 1.84; 3; 20; 10; 1; 12; 200; 27; 25; 2 TABLET ORAL at 09:18

## 2020-08-30 RX ADMIN — IBUPROFEN 600 MG: 600 TABLET, FILM COATED ORAL at 16:07

## 2020-08-30 RX ADMIN — OXYCODONE HYDROCHLORIDE 5 MG: 5 TABLET ORAL at 13:29

## 2020-08-30 RX ADMIN — ACETAMINOPHEN 650 MG: 325 TABLET, FILM COATED ORAL at 18:21

## 2020-08-30 RX ADMIN — DOCUSATE SODIUM 100 MG: 100 CAPSULE, LIQUID FILLED ORAL at 09:18

## 2020-08-30 RX ADMIN — IBUPROFEN 600 MG: 600 TABLET, FILM COATED ORAL at 09:18

## 2020-08-30 NOTE — PROGRESS NOTES
Patient out of bed to bathroom independently and taught lan care.  Patient voided 800 ml without difficulty. Urine specimen sent to lab for urine drug screen.  Patient ambulated with steady gait.  Denies any dizziness.  Assisted back to bed, tolerated well.  Will continue to monitor.

## 2020-08-30 NOTE — CARE PLAN
Problem: Altered physiologic condition related to immediate post-delivery state and potential for bleeding/hemorrhage  Goal: Patient physiologically stable as evidenced by normal lochia, palpable uterine involution and vital signs within normal limits  Intervention: Massage fundus as necessary to prevent excessive lochia  Note: Fundal massage done with light bleeding

## 2020-08-30 NOTE — PROGRESS NOTES
Name:  Sandie   ID Number:  [id number]    Content Discussed:  Patient assessment completed. Discussed pain management plan and patient requests medications be offered as available. Patient denies dizziness and headaches; states she is voiding w/o difficulty. Reviewed plan of care, all questions answered, and rounding in place.

## 2020-08-30 NOTE — LACTATION NOTE
"This note was copied from a baby's chart.  Baby 41.1 weeks, , baby 1 day & 12 hours old, weight loss 3.5%, MOB Hx LPC, COVID +. Used 3-way Language line phone  Maria Isabel # [id number]. Mother reports she breast & bottle fed previous 2 babies. Mother states she  previous babies for over 1 year each. Mother states she wants to breast & bottle feed because she \"does not have any milk\". Educated mother on 10-20-30 supplemental guideline volumes, guideline sheet given by Nurse Redmond. Discussed with mother supply & demand, then more baby latches and stimulates the breasts the sooner milk comes in and the more milk she will make. Mother reports she has [facility] and plans to follow-up with them once discharged.    Teaching on hunger cues, breastfeeding when baby shows cues or by 4 hours from last feed, breastfeed first then offer supplement & cluster feeding.     Breastfeeding plan:  Breastfeed on cue a minimum of 8x/24 hours or by 4 hours from last feed, may hand express & spoon feed back 4-5 times during day in addition to breastfeeding to increase stimulation.  "

## 2020-08-30 NOTE — CARE PLAN
Problem: Alteration in comfort related to episiotomy, vaginal repair and/or after birth pains  Goal: Patient is able to ambulate, care for self and infant  Intervention: Assess 0-10 pain level with vital signs  Note: Pain controlled

## 2020-08-30 NOTE — CARE PLAN
Problem: Altered physiologic condition related to immediate post-delivery state and potential for bleeding/hemorrhage  Goal: Patient physiologically stable as evidenced by normal lochia, palpable uterine involution and vital signs within normal limits  Outcome: PROGRESSING AS EXPECTED  Note: Patient is physiologically stable as evidenced by light lochia rubra, firm fundus one fingerbreadth below the umbilicus, and vital signs WDL. Will continue to monitor patient condition.       Problem: Alteration in comfort related to episiotomy, vaginal repair and/or after birth pains  Goal: Patient is able to ambulate, care for self and infant  Outcome: PROGRESSING AS EXPECTED  Note: Discussed pain management and verbalization of pain utilizing the 0-10 pain scale. White board updated with times of pain medication availability and pt requests pain medication be provided as available. Discussed non-pharmacological pain control therapies and patient declined at this time, stating no need. Pt ambulates with a steady gait and demonstrates the ability to participate in ADLs and provide care for  son.

## 2020-08-30 NOTE — PROGRESS NOTES
2000 Pt doing well bonding with baby, Assessment done denies pain at this time, Encourage to call for assistance, Needs attended.

## 2020-08-31 ENCOUNTER — PHARMACY VISIT (OUTPATIENT)
Dept: PHARMACY | Facility: MEDICAL CENTER | Age: 30
End: 2020-08-31
Payer: COMMERCIAL

## 2020-08-31 VITALS
OXYGEN SATURATION: 97 % | TEMPERATURE: 97.9 F | DIASTOLIC BLOOD PRESSURE: 71 MMHG | RESPIRATION RATE: 18 BRPM | HEART RATE: 87 BPM | WEIGHT: 190.92 LBS | HEIGHT: 66 IN | BODY MASS INDEX: 30.68 KG/M2 | SYSTOLIC BLOOD PRESSURE: 113 MMHG

## 2020-08-31 LAB
SARS-COV-2 RNA RESP QL NAA+PROBE: DETECTED
SPECIMEN SOURCE: ABNORMAL

## 2020-08-31 PROCEDURE — 700102 HCHG RX REV CODE 250 W/ 637 OVERRIDE(OP): Performed by: OBSTETRICS & GYNECOLOGY

## 2020-08-31 PROCEDURE — A9270 NON-COVERED ITEM OR SERVICE: HCPCS | Performed by: STUDENT IN AN ORGANIZED HEALTH CARE EDUCATION/TRAINING PROGRAM

## 2020-08-31 PROCEDURE — RXMED WILLOW AMBULATORY MEDICATION CHARGE: Performed by: FAMILY MEDICINE

## 2020-08-31 PROCEDURE — A9270 NON-COVERED ITEM OR SERVICE: HCPCS | Performed by: OBSTETRICS & GYNECOLOGY

## 2020-08-31 PROCEDURE — 700102 HCHG RX REV CODE 250 W/ 637 OVERRIDE(OP): Performed by: STUDENT IN AN ORGANIZED HEALTH CARE EDUCATION/TRAINING PROGRAM

## 2020-08-31 PROCEDURE — 700111 HCHG RX REV CODE 636 W/ 250 OVERRIDE (IP): Performed by: OBSTETRICS & GYNECOLOGY

## 2020-08-31 RX ORDER — PSEUDOEPHEDRINE HCL 30 MG
100 TABLET ORAL 2 TIMES DAILY PRN
Qty: 60 CAP | Refills: 0 | Status: SHIPPED | OUTPATIENT
Start: 2020-08-31

## 2020-08-31 RX ORDER — ACETAMINOPHEN AND CODEINE PHOSPHATE 120; 12 MG/5ML; MG/5ML
1 SOLUTION ORAL DAILY
Qty: 84 TAB | Refills: 4 | Status: SHIPPED | OUTPATIENT
Start: 2020-08-31

## 2020-08-31 RX ORDER — IBUPROFEN 600 MG/1
600 TABLET ORAL EVERY 6 HOURS PRN
Qty: 30 TAB | Refills: 0 | Status: SHIPPED | OUTPATIENT
Start: 2020-08-31

## 2020-08-31 RX ADMIN — IBUPROFEN 600 MG: 600 TABLET, FILM COATED ORAL at 02:44

## 2020-08-31 RX ADMIN — ENOXAPARIN SODIUM 40 MG: 40 INJECTION SUBCUTANEOUS at 05:55

## 2020-08-31 RX ADMIN — PRENATAL WITH FERROUS FUM AND FOLIC ACID 1 TABLET: 3080; 920; 120; 400; 22; 1.84; 3; 20; 10; 1; 12; 200; 27; 25; 2 TABLET ORAL at 09:38

## 2020-08-31 RX ADMIN — IBUPROFEN 600 MG: 600 TABLET, FILM COATED ORAL at 09:38

## 2020-08-31 RX ADMIN — DOCUSATE SODIUM 100 MG: 100 CAPSULE, LIQUID FILLED ORAL at 09:38

## 2020-08-31 RX ADMIN — OXYCODONE HYDROCHLORIDE 5 MG: 5 TABLET ORAL at 02:44

## 2020-08-31 NOTE — DISCHARGE SUMMARY
NORMAL SPONTANEOUS VAGINAL DISCHARGE SUMMARY    PATIENT ID:  NAME:  Tiffanie Rush  MRN:               [id number]  YOB: 1990    DATE OF ADMISSION: 2020    DATE OF DISCHARGE: 2020     ADMITTING DIAGNOSIS: Intrauterine pregnancy at 41w1d.    DISCHARGE DIAGNOSIS: s/p     HOSPITAL COURSE: This is a 30 y.o. year old female admitted at [place] [address] at 41w1d who presented with no contractions, no LOF, no vaginal bleeding, normal FM and in active labor. Pt was 2 cm dilated, 75% effaced and at  -3 station on sterile vaginal exam. Pregnancy was complicated by COVID-19. The patient had a good labor pattern after admission after induction and proceeded to deliver a viable male infant weighing  8 lbs and 7.6 oz. Infants Apgars scores were 8 and 9 at one and five minutes. The patients postpartum course was uncomplicated and she was discharged home in stable condition on postpartum day #2.    PROCEDURES PERFORMED: Normal spontaneous vaginal delivery.    COMPLICATIONS: Shoulder dystocia x 40sec    DIET: Regular    ACTIVITY: No intercourse and nothing inserted into the vagina for 6 weeks.    MEDICATIONS:  Current Outpatient Medications   Medication Sig Dispense Refill   • docusate sodium 100 MG Cap Take 100 mg by mouth 2 times a day as needed for Constipation. 60 Cap 0   • ibuprofen (MOTRIN) 600 MG Tab Take 1 Tab by mouth every 6 hours as needed (cramping). 30 Tab 0   • norethindrone (MICRONOR) 0.35 MG tablet Take 1 Tab by mouth every day. 84 Tab 4         FO[facility]UP:  [facility]  2) Return to the hospital if copious vaginal bleeding or foul smelling discharge is noted    Octavio Orantes M.D.

## 2020-08-31 NOTE — DISCHARGE PLANNING
Discharge Planning Assessment Post Partum    Reason for Referral: Late prenatal care, MOB is from [place]-may need resources.  Address: [address] [place] [address] [place] [address]  Phone: [phone]  Mom Diagnosis: Pregnancy, COVID+  Baby Diagnosis: [place]  Primary Language: Serbian speaking    Father of the Baby: In [place] with their two sons (ages 6 and 10)  Involved in baby’s care? Yes    Prenatal Care: Yes, at [facility] starting at 20 weeks.  Total of 10 visits.  Mom's PCP: Dr. Champagne  PCP for new baby: DORA Landers    Support System: Olamide David ([phone])  Coping/Bonding between mother & baby: Yes  Source of Feeding: breast and bottle    Mom's Insurance: [employer] Medicaid   Baby Covered on Insurance:Yes  Other children in the home/names & ages: Two boys; ages 6 and 10 that are in [place] with their father     Mom's Mental status: alert and oriented  Services used prior to admit: Medicaid and [facility]    CPS History: No  Psychiatric History: No  Domestic Violence History: No  Drug/ETOH History: No, MOB's UDS was positive for oxycodone but was given Roxicodone prior to test.  Infant's UDS is negative.    Resources Provided: packet of resources: pediatrician list, children and family resource list, post partum support and counseling resources, and a list of [facility] clinics  Referrals Made: diaper bank referral provided     Clearance for Discharge: Infant is cleared to discharge home with MOB.     Ongoing Plan: Packet of resources given to RN who will provide to mother.

## 2020-08-31 NOTE — DISCHARGE INSTRUCTIONS
POSTPARTUM DISCHARGE INSTRUCTIONS FOR MOM    YOB: 1990   Age: 30 y.o.               Admit Date: 8/28/2020     Discharge Date: 8/31/2020  Attending Doctor:  Nitin Antoine M.D.                  Allergies:  Patient has no known allergies.    Discharged to home by car. Discharged via wheelchair, hospital escort: Yes.  Special equipment needed: Not Applicable  Belongings with: Personal  Be sure to schedule a follow-up appointment with your primary care doctor or any specialists as instructed.     Discharge Plan:   Diet Plan: Discussed  Activity Level: Discussed  Confirmed Follow up Appointment: Patient to Call and Schedule Appointment  Medication Reconciliation Updated: Yes    REASONS TO CALL YOUR OBSTETRICIAN:  1.   Persistent fever or shaking chills (Temperature higher than 100.4)  2.   Heavy bleeding (soaking more than 1 pad per hour); Passing clots  3.   Foul odor from vagina  4.   Mastitis (Breast infection; breast pain, chills, fever, redness)  5.   Urinary pain, burning or frequency  6.   Episiotomy infection  7. Severe depression longer than 24 hours    HAND WASHING  · Prior to handling the baby.  · Before breastfeeding or bottle feeding baby.  · After using the bathroom or changing the baby's diaper.  VAGINAL CARE  · Nothing inside vagina for 6 weeks: no sexual intercourse, tampons or douching.  · Bleeding may continue for 2-4 weeks.  Amount may vary.    · Call your physician for heavy bleeding which means soaking more than 1 pad per hour    BIRTH CONTROL  · It is possible to become pregnant at any time after delivery and while breastfeeding.  · Plan to discuss a method of birth control with your physician at your follow up visit. visit.    DIET AND ELIMINATION  · Eating more fiber (bran cereal, fruits, and vegetables) and drinking plenty of fluids will help to avoid constipation.  · Urinary frequency after childbirth is normal.    POSTPARTUM BLUES  During the first few days after birth, you may  "experience a sense of the \"blues\" which may include impatience, irritability or even crying.  These feeling come and go quickly.  However, as many as 1 in 10 women experience emotional symptoms known as postpartum depression.    Postpartum depression:  May start as early as the second or third day after delivery or take several weeks or months to develop.  Symptoms of \"blues\" are present, but are more intense:  Crying spells; loss of appetite; feelings of hopelessness or loss of control; fear of touching the baby; over concern or no concern at all about the baby; little or no concern about your own appearance/caring for yourself; and/or inability to sleep or excessive sleeping.  Contact your physician if you are experiencing any of these symptoms.    Crisis Hotline:  · [place] Crisis Hotline:  2-792-YWTLWLI  Or 1-321.310.8927  · [place] Crisis Hotline:  1-633.665.7119  Or 708-219-4134    PREVENTING SHAKEN BABY:  If you are angry or stressed, PUT THE BABY IN THE CRIB, step away, take some deep breaths, and wait until you are calm to care for the baby.  DO NOT SHAKE THE BABY.  You are not alone, call a supporter for help.    · Crisis Call Center 24/7 crisis line 227-299-2845 or 1-590.180.4374  · You can also text them, text \"ANSWER\" to 330835    QUIT SMOKING/TOBACCO USE:  I understand the use of any tobacco products increases my chance of suffering from future heart disease and could cause other illnesses which may shorten my life. Quitting the use of tobacco products is the single most important thing I can do to improve my health. For further information on smoking / tobacco cessation call a Toll Free Quit Line at 1-536.526.3812 (*National Cancer [place]) or 1-350.367.4520 (American Lung Association) or you can access the web based program at www.lungusa.org.    · [place] Tobacco Users Help Line:  (517) 359-6679       Toll Free: 1-798.185.2153  · Quit Tobacco Program [facility] " (572) 997-2421    DEPRESSION / SUICIDE RISK:  As you are discharged from this [facility] facility, it is important to learn how to keep safe from harming yourself.    Recognize the warning signs:  · Abrupt changes in personality, positive or negative- including increase in energy   · Giving away possessions  · Change in eating patterns- significant weight changes-  positive or negative  · Change in sleeping patterns- unable to sleep or sleeping all the time   · Unwillingness or inability to communicate  · Depression  · Unusual sadness, discouragement and loneliness  · Talk of wanting to die  · Neglect of personal appearance   · Rebelliousness- reckless behavior  · Withdrawal from people/activities they love  · Confusion- inability to concentrate     If you or a loved one observes any of these behaviors or has concerns about self-harm, here's what you can do:  · Talk about it- your feelings and reasons for harming yourself  · Remove any means that you might use to hurt yourself (examples: pills, rope, extension cords, firearm)  · Get professional help from the community (Mental Health, Substance Abuse, psychological counseling)  · Do not be alone:Call your Safe Contact- someone whom you trust who will be there for you.  · Call your local CRISIS HOTLINE 617-8871 or 803-038-6612  · Call your local Children's Mobile Crisis Response Team [place] (903) 115-1182 or www.LoraxAg  · Call the toll free National Suicide Prevention Hotlines   · National Suicide Prevention Lifeline 759-830-TAZS (0581)  · National Hope Line Network 800-SUICIDE (202-8040)    DISCHARGE SURVEY:  Thank you for choosing [facility].  We hope we provided you with very good care.  You may be receiving a survey in the mail.  Please fill it out.  Your opinion is valuable to us.    ADDITIONAL EDUCATIONAL MATERIALS GIVEN TO PATIENT:        My signature on this form indicates that:  1.  I have reviewed and understand the above  information  2.  My questions regarding this information have been answered to my satisfaction.  3.  I have formulated a plan with my discharge nurse to obtain my prescribed medication for home.  DISCHARGE INSTRUCTIONS (Com[place]an) POSTPARTUM FOR MOM      NICO LAS CORRIE:  · Antes de tocar el bebé.  · Antes del amamantamiento o darle al bebé lactancia artificial.  · Después de usar el baño.    CUIDADO DE LAS HERIDAS:  · La Incisión de la Operación Cesárea:  Mantenga limpea y seca, NO levante nada que pesa más que carrillo bebé por 6 semenas.  No debe ninguna apertura ni pus.  · Episiotomía/Diego Vaginal:  Use un spray de Tucks o Dermoplast, tome un baño de asiento cuando necesite (6 pulgadas), siga usando la botella Yris hasta que pare de sangrar.    CUIDADA DEL SENO:  · Lleve un sostén.  · Si esta` amamantando no use jabón en el seno ni en los pezones.  · Aplique lanolina si los pezones se ponen quebrazados y si le duelen.    CUIDADO DE LA VAGINA:  · Matinicus puede entrar la vagina por 6 semanas:  Actividad sexual, Ducha vaginal, Tampones.  · El sangramiento puede seguir por 2 a 4 semanas.  La cantidad es variable.  Llame a carrillo med`ico(a) obstetra si hay mucha cora (si usa ma`s de devin toalla femenina cada hora).    CONTRACEPCIÒN:  · Es posible embarazarse en cualquier tiempo despus del parto y mientras el amamantamiento.  · Debe planear de discutir los metodos de cantracepción con carrillo médico(a) cuando vuelva en 6 semanas para carrillo revisión médica.    DIETA Y DEFECACÒN:  · Para evitar la constipación coma mas fibra (cereal salvado, fruta, y verduras) Y tome muchos liquidos.   · Es común orinar frecuentemente después del parto.    POSTPARTO Y LA DEPRESÒN:  Baljinder los primeros ellsworth después del parto es común sentirse:  · Impaciente, irritable, o llorar  Estos sentimientos llegan y van rapidamente.  No obstante, yudy devin de cada bill mujeres tiene síntomas emocionales conocidos anne depresión postparto.  · Despresión Postparto:   Puede empezar tan pronto anne el terrance o tercer día después del parto o puede durar algunas semanas o meses para desarrollar.  Síntomas de la depresión pueden presentarse, edwin son más intensos:  · Pérdida del hambre  · Frecuencia de llorar  · Sentirse desesperada o perder el control  · Demasiada o no suficiente preocupación con carrillo bebé  · Tener miedo de tocar el bebé  · No preocuparse con carrillo propia apariencia  · Incapacidad de dormir o dormir excesivamente    DEPRESIÒN / RIESGO AL SUICIDIO:    Cuando se le da de gaby de alguna entidad de [facility], es importante aprender a mantener a urbano de hacerse daño a sí mismo.    Reconocer los signos de advertencia:  · Cambios bruscos en la peronalidad, positiva o negativa, incluyendo aumento de la energia  · Regalar posesiones  · Cambios en patrones de comer - significativos cambios de peso - positivos o negativos  · Cambio de patrones para dormir - no poder dormir o dormir todo el tiempo  · Falta de voluntad o incapacidad de comunicarse  · Depresión  · Minal, desánimo y tristeza inusual  · Habla de querer morir  · Descuido del aspecto personal  · Rebeldía - comportamiento imprudente  · Retiro de personas y actividades que les gusta  · Confusión-incapacidad para concentrarse    Si usted o un ser querido observa cualquiera de estos comportamientos o tiene preocupaciones de hacerse daño a si mismo, aquí le damos lo que usted puede hacer:  · Hablar de ti - tus sentimientos y razones para hacerse beth a si mismo  · Retire cualquier medio que se podría utilizar para lastimarse (ejemplos: píldoras, cuerdas, cordones de extensión, arma de walter)  · Obtenga ayuda profesional de la comunidad (agueda mental, abuso de sustancias, orientación psicológica)  · No estar solo: llamar a un contacto seguro - devin persona que confía en que estará allí por usted  · Llamada local L´INEA de CRISIS [phone] y [phone]  · Llamada local [employer] Para Crisis de Niños  Ivon Orellana [phone] or www.Iptune.com  · Llamada gratuita nacional, líneas de prevención del suicidio  · Preventión del Suicidio Nacional [facility] [phone] (9864)  · Línea Nacional de la Tania de Red 800-SUICIDE ([phone])       ([employer])

## 2020-08-31 NOTE — DISCHARGE PLANNING
Medication reconcilliation completed. Medications delivered to RN at bedside. Written information regarding the dispensed prescriptions was provided to the patient including the phone number of the pharmacy to call for any questions.       Tiffanie Rush   Home Medication Instructions CAL:[id number]    Printed on:08/31/20 1324   Medication Information                      docusate sodium 100 MG Cap  Take 1 capsule by mouth 2 times a day as needed for Constipation.             ibuprofen (MOTRIN) 600 MG Tab  Take 1 Tab by mouth every 6 hours as needed (cramping).             norethindrone (MICRONOR) 0.35 MG tablet  Take 1 Tab by mouth every day.

## 2020-08-31 NOTE — PROGRESS NOTES
Report received from Trini SOTO @ the change of  shift.  Assumed care. Discussed plan of care especially on managing pain. Assessment done. Medicated for pain. Encouraged to call if with needs. Will check at intervals.

## 2020-08-31 NOTE — PROGRESS NOTES
1900- Bedside report done. Patient in bed, denies pain at this time. Will continue to monitor.   2145- [facility] text this nurse to send Covid swab.   2155- Covid swab sent to follow up results.

## 2020-08-31 NOTE — LACTATION NOTE
Spoke with mother via telephone as she is in isolation for Covid-19. Consult using  Fany #[id number] via language line solutions. Mother reports she is feeding her infant independently and declines assistance with breastfeeding prior to discharge home. She is combination breast and formula feeding and feels happy with her plan. She is established with WIC for ongoing lactation support services. She denies questions/concerns.

## 2020-09-01 NOTE — PROGRESS NOTES
Discharge instruction for mom and baby discussed. Prescription given and explained. Emphasized the importance of  screening follow-up test. Questions and concerns have been answered. Baby's ID band matches with MOB.